# Patient Record
Sex: MALE | Race: BLACK OR AFRICAN AMERICAN | Employment: OTHER | URBAN - METROPOLITAN AREA
[De-identification: names, ages, dates, MRNs, and addresses within clinical notes are randomized per-mention and may not be internally consistent; named-entity substitution may affect disease eponyms.]

---

## 2019-05-29 ENCOUNTER — HOSPITAL ENCOUNTER (OUTPATIENT)
Dept: PREADMISSION TESTING | Age: 64
Discharge: HOME | End: 2019-05-29
Attending: OTOLARYNGOLOGY
Payer: MEDICARE

## 2019-05-29 DIAGNOSIS — Z01.818 OTHER SPECIFIED PRE-OPERATIVE EXAMINATION: ICD-10-CM

## 2019-05-29 DIAGNOSIS — Z01.812 BLOOD TESTS PRIOR TO TREATMENT OR PROCEDURE: ICD-10-CM

## 2019-05-29 DIAGNOSIS — D48.1 NEOPLASM OF UNCERTAIN BEHAVIOR OF CONNECTIVE AND OTHER SOFT TISSUE: ICD-10-CM

## 2019-05-29 LAB
HCT VFR BLD AUTO: 40.4 % (ref 36–48)
HGB BLD-MCNC: 13.4 G/DL (ref 13–16)
POTASSIUM SERPL-SCNC: 4.4 MMOL/L (ref 3.5–5.5)

## 2019-05-29 PROCEDURE — 93005 ELECTROCARDIOGRAM TRACING: CPT

## 2019-05-29 PROCEDURE — 85018 HEMOGLOBIN: CPT

## 2019-05-29 PROCEDURE — 36415 COLL VENOUS BLD VENIPUNCTURE: CPT

## 2019-05-29 PROCEDURE — 84132 ASSAY OF SERUM POTASSIUM: CPT

## 2019-05-30 LAB
ATRIAL RATE: 53 BPM
CALCULATED P AXIS, ECG09: 66 DEGREES
CALCULATED R AXIS, ECG10: -45 DEGREES
CALCULATED T AXIS, ECG11: 2 DEGREES
DIAGNOSIS, 93000: NORMAL
FAX TO INFO,FAXT: NORMAL
FAX TO NUMBER,FAXN: NORMAL
P-R INTERVAL, ECG05: 206 MS
Q-T INTERVAL, ECG07: 422 MS
QRS DURATION, ECG06: 116 MS
QTC CALCULATION (BEZET), ECG08: 395 MS
VENTRICULAR RATE, ECG03: 53 BPM

## 2019-06-05 ENCOUNTER — HOSPITAL ENCOUNTER (OUTPATIENT)
Dept: LAB | Age: 64
Discharge: HOME | End: 2019-06-05
Payer: MEDICARE

## 2019-06-05 PROCEDURE — 88331 PATH CONSLTJ SURG 1 BLK 1SPC: CPT

## 2019-06-05 PROCEDURE — 88305 TISSUE EXAM BY PATHOLOGIST: CPT

## 2019-06-05 PROCEDURE — 88342 IMHCHEM/IMCYTCHM 1ST ANTB: CPT

## 2021-07-02 ENCOUNTER — HOSPITAL ENCOUNTER (OUTPATIENT)
Dept: PREADMISSION TESTING | Age: 66
Discharge: HOME | End: 2021-07-02
Payer: MEDICARE

## 2021-07-02 ENCOUNTER — TRANSCRIBE ORDER (OUTPATIENT)
Dept: REGISTRATION | Age: 66
End: 2021-07-02

## 2021-07-02 DIAGNOSIS — D49.1 PLEURAL NEOPLASM: Primary | ICD-10-CM

## 2021-07-02 DIAGNOSIS — D49.1 PLEURAL NEOPLASM: ICD-10-CM

## 2021-07-02 LAB
ANION GAP SERPL CALC-SCNC: 6 MMOL/L (ref 3–18)
ATRIAL RATE: 82 BPM
BUN SERPL-MCNC: 15 MG/DL (ref 7–18)
BUN/CREAT SERPL: 14 (ref 12–20)
CALCIUM SERPL-MCNC: 8.3 MG/DL (ref 8.5–10.1)
CALCULATED P AXIS, ECG09: 75 DEGREES
CALCULATED R AXIS, ECG10: -63 DEGREES
CALCULATED T AXIS, ECG11: 49 DEGREES
CHLORIDE SERPL-SCNC: 115 MMOL/L (ref 100–111)
CO2 SERPL-SCNC: 22 MMOL/L (ref 21–32)
CREAT SERPL-MCNC: 1.09 MG/DL (ref 0.6–1.3)
DIAGNOSIS, 93000: NORMAL
EST. AVERAGE GLUCOSE BLD GHB EST-MCNC: 120 MG/DL
GLUCOSE SERPL-MCNC: 130 MG/DL (ref 74–99)
HBA1C MFR BLD: 5.8 % (ref 4.2–5.6)
HCT VFR BLD AUTO: 42.8 % (ref 36–48)
HGB BLD-MCNC: 13.1 G/DL (ref 13–16)
P-R INTERVAL, ECG05: 206 MS
POTASSIUM SERPL-SCNC: 4.4 MMOL/L (ref 3.5–5.5)
Q-T INTERVAL, ECG07: 370 MS
QRS DURATION, ECG06: 104 MS
QTC CALCULATION (BEZET), ECG08: 432 MS
SODIUM SERPL-SCNC: 143 MMOL/L (ref 136–145)
VENTRICULAR RATE, ECG03: 82 BPM

## 2021-07-02 PROCEDURE — 93005 ELECTROCARDIOGRAM TRACING: CPT

## 2021-07-02 PROCEDURE — 36415 COLL VENOUS BLD VENIPUNCTURE: CPT

## 2021-07-02 PROCEDURE — 83036 HEMOGLOBIN GLYCOSYLATED A1C: CPT

## 2021-07-02 PROCEDURE — 80048 BASIC METABOLIC PNL TOTAL CA: CPT

## 2021-07-02 PROCEDURE — 85018 HEMOGLOBIN: CPT

## 2021-07-14 ENCOUNTER — ANESTHESIA EVENT (OUTPATIENT)
Dept: SURGERY | Age: 66
End: 2021-07-14
Payer: MEDICARE

## 2021-07-14 ENCOUNTER — HOSPITAL ENCOUNTER (OUTPATIENT)
Age: 66
Setting detail: OUTPATIENT SURGERY
Discharge: HOME | End: 2021-07-14
Attending: OTOLARYNGOLOGY | Admitting: OTOLARYNGOLOGY
Payer: MEDICARE

## 2021-07-14 ENCOUNTER — ANESTHESIA (OUTPATIENT)
Dept: SURGERY | Age: 66
End: 2021-07-14
Payer: MEDICARE

## 2021-07-14 VITALS
HEART RATE: 71 BPM | WEIGHT: 183 LBS | OXYGEN SATURATION: 98 % | SYSTOLIC BLOOD PRESSURE: 148 MMHG | RESPIRATION RATE: 20 BRPM | TEMPERATURE: 97.6 F | BODY MASS INDEX: 22.29 KG/M2 | DIASTOLIC BLOOD PRESSURE: 71 MMHG | HEIGHT: 76 IN

## 2021-07-14 DIAGNOSIS — C32.1 SQUAMOUS CELL CANCER OF EPIGLOTTIS (HCC): Primary | ICD-10-CM

## 2021-07-14 LAB
GLUCOSE BLD STRIP.AUTO-MCNC: 142 MG/DL (ref 70–110)
GLUCOSE BLD STRIP.AUTO-MCNC: 207 MG/DL (ref 70–110)
GLUCOSE BLD STRIP.AUTO-MCNC: 63 MG/DL (ref 70–110)
GLUCOSE BLD STRIP.AUTO-MCNC: 64 MG/DL (ref 70–110)

## 2021-07-14 PROCEDURE — 74011000250 HC RX REV CODE- 250: Performed by: NURSE ANESTHETIST, CERTIFIED REGISTERED

## 2021-07-14 PROCEDURE — 77030020782 HC GWN BAIR PAWS FLX 3M -B: Performed by: OTOLARYNGOLOGY

## 2021-07-14 PROCEDURE — 74011250636 HC RX REV CODE- 250/636: Performed by: OTOLARYNGOLOGY

## 2021-07-14 PROCEDURE — 76210000021 HC REC RM PH II 0.5 TO 1 HR: Performed by: OTOLARYNGOLOGY

## 2021-07-14 PROCEDURE — 76010000149 HC OR TIME 1 TO 1.5 HR: Performed by: OTOLARYNGOLOGY

## 2021-07-14 PROCEDURE — 76060000033 HC ANESTHESIA 1 TO 1.5 HR: Performed by: OTOLARYNGOLOGY

## 2021-07-14 PROCEDURE — 74011000250 HC RX REV CODE- 250: Performed by: OTOLARYNGOLOGY

## 2021-07-14 PROCEDURE — 88305 TISSUE EXAM BY PATHOLOGIST: CPT

## 2021-07-14 PROCEDURE — 74011000250 HC RX REV CODE- 250: Performed by: ANESTHESIOLOGY

## 2021-07-14 PROCEDURE — 88342 IMHCHEM/IMCYTCHM 1ST ANTB: CPT

## 2021-07-14 PROCEDURE — 74011250637 HC RX REV CODE- 250/637: Performed by: OTOLARYNGOLOGY

## 2021-07-14 PROCEDURE — 88331 PATH CONSLTJ SURG 1 BLK 1SPC: CPT

## 2021-07-14 PROCEDURE — 77030006643: Performed by: ANESTHESIOLOGY

## 2021-07-14 PROCEDURE — 77030040361 HC SLV COMPR DVT MDII -B: Performed by: OTOLARYNGOLOGY

## 2021-07-14 PROCEDURE — 74011250636 HC RX REV CODE- 250/636: Performed by: ANESTHESIOLOGY

## 2021-07-14 PROCEDURE — 77030008477 HC STYL SATN SLP COVD -A: Performed by: ANESTHESIOLOGY

## 2021-07-14 PROCEDURE — 76210000016 HC OR PH I REC 1 TO 1.5 HR: Performed by: OTOLARYNGOLOGY

## 2021-07-14 PROCEDURE — 82962 GLUCOSE BLOOD TEST: CPT

## 2021-07-14 PROCEDURE — 2709999900 HC NON-CHARGEABLE SUPPLY: Performed by: OTOLARYNGOLOGY

## 2021-07-14 PROCEDURE — 74011250636 HC RX REV CODE- 250/636: Performed by: NURSE ANESTHETIST, CERTIFIED REGISTERED

## 2021-07-14 PROCEDURE — 77030008683 HC TU ET CUF COVD -A: Performed by: ANESTHESIOLOGY

## 2021-07-14 RX ORDER — MAGNESIUM SULFATE 100 %
4 CRYSTALS MISCELLANEOUS AS NEEDED
Status: DISCONTINUED | OUTPATIENT
Start: 2021-07-14 | End: 2021-07-14 | Stop reason: HOSPADM

## 2021-07-14 RX ORDER — SODIUM CHLORIDE, SODIUM LACTATE, POTASSIUM CHLORIDE, CALCIUM CHLORIDE 600; 310; 30; 20 MG/100ML; MG/100ML; MG/100ML; MG/100ML
125 INJECTION, SOLUTION INTRAVENOUS CONTINUOUS
Status: DISCONTINUED | OUTPATIENT
Start: 2021-07-14 | End: 2021-07-14 | Stop reason: HOSPADM

## 2021-07-14 RX ORDER — EPINEPHRINE 1 MG/ML
INJECTION, SOLUTION, CONCENTRATE INTRAVENOUS AS NEEDED
Status: DISCONTINUED | OUTPATIENT
Start: 2021-07-14 | End: 2021-07-14 | Stop reason: HOSPADM

## 2021-07-14 RX ORDER — LIDOCAINE HYDROCHLORIDE 40 MG/ML
SOLUTION TOPICAL AS NEEDED
Status: DISCONTINUED | OUTPATIENT
Start: 2021-07-14 | End: 2021-07-14 | Stop reason: HOSPADM

## 2021-07-14 RX ORDER — SUCCINYLCHOLINE CHLORIDE 100 MG/5ML
SYRINGE (ML) INTRAVENOUS AS NEEDED
Status: DISCONTINUED | OUTPATIENT
Start: 2021-07-14 | End: 2021-07-14 | Stop reason: HOSPADM

## 2021-07-14 RX ORDER — LIDOCAINE HYDROCHLORIDE 20 MG/ML
INJECTION, SOLUTION EPIDURAL; INFILTRATION; INTRACAUDAL; PERINEURAL AS NEEDED
Status: DISCONTINUED | OUTPATIENT
Start: 2021-07-14 | End: 2021-07-14 | Stop reason: HOSPADM

## 2021-07-14 RX ORDER — FENTANYL CITRATE 50 UG/ML
INJECTION, SOLUTION INTRAMUSCULAR; INTRAVENOUS AS NEEDED
Status: DISCONTINUED | OUTPATIENT
Start: 2021-07-14 | End: 2021-07-14 | Stop reason: HOSPADM

## 2021-07-14 RX ORDER — DEXTROSE, SODIUM CHLORIDE, SODIUM LACTATE, POTASSIUM CHLORIDE, AND CALCIUM CHLORIDE 5; .6; .31; .03; .02 G/100ML; G/100ML; G/100ML; G/100ML; G/100ML
125 INJECTION, SOLUTION INTRAVENOUS CONTINUOUS
Status: DISCONTINUED | OUTPATIENT
Start: 2021-07-14 | End: 2021-07-14 | Stop reason: HOSPADM

## 2021-07-14 RX ORDER — MIDAZOLAM HYDROCHLORIDE 1 MG/ML
INJECTION, SOLUTION INTRAMUSCULAR; INTRAVENOUS AS NEEDED
Status: DISCONTINUED | OUTPATIENT
Start: 2021-07-14 | End: 2021-07-14 | Stop reason: HOSPADM

## 2021-07-14 RX ORDER — DEXAMETHASONE SODIUM PHOSPHATE 4 MG/ML
INJECTION, SOLUTION INTRA-ARTICULAR; INTRALESIONAL; INTRAMUSCULAR; INTRAVENOUS; SOFT TISSUE AS NEEDED
Status: DISCONTINUED | OUTPATIENT
Start: 2021-07-14 | End: 2021-07-14 | Stop reason: HOSPADM

## 2021-07-14 RX ORDER — FENTANYL CITRATE 50 UG/ML
25 INJECTION, SOLUTION INTRAMUSCULAR; INTRAVENOUS
Status: DISCONTINUED | OUTPATIENT
Start: 2021-07-14 | End: 2021-07-14 | Stop reason: HOSPADM

## 2021-07-14 RX ORDER — NALOXONE HYDROCHLORIDE 0.4 MG/ML
0.1 INJECTION, SOLUTION INTRAMUSCULAR; INTRAVENOUS; SUBCUTANEOUS
Status: DISCONTINUED | OUTPATIENT
Start: 2021-07-14 | End: 2021-07-14 | Stop reason: HOSPADM

## 2021-07-14 RX ORDER — INSULIN LISPRO 100 [IU]/ML
INJECTION, SOLUTION INTRAVENOUS; SUBCUTANEOUS ONCE
Status: DISCONTINUED | OUTPATIENT
Start: 2021-07-14 | End: 2021-07-14 | Stop reason: HOSPADM

## 2021-07-14 RX ORDER — OXYCODONE AND ACETAMINOPHEN 5; 325 MG/1; MG/1
1 TABLET ORAL AS NEEDED
Status: DISCONTINUED | OUTPATIENT
Start: 2021-07-14 | End: 2021-07-14 | Stop reason: HOSPADM

## 2021-07-14 RX ORDER — SODIUM CHLORIDE, SODIUM LACTATE, POTASSIUM CHLORIDE, CALCIUM CHLORIDE 600; 310; 30; 20 MG/100ML; MG/100ML; MG/100ML; MG/100ML
50 INJECTION, SOLUTION INTRAVENOUS CONTINUOUS
Status: DISCONTINUED | OUTPATIENT
Start: 2021-07-14 | End: 2021-07-14 | Stop reason: HOSPADM

## 2021-07-14 RX ORDER — CLINDAMYCIN PHOSPHATE 900 MG/50ML
900 INJECTION, SOLUTION INTRAVENOUS ONCE
Status: DISCONTINUED | OUTPATIENT
Start: 2021-07-14 | End: 2021-07-14 | Stop reason: HOSPADM

## 2021-07-14 RX ORDER — HYDRALAZINE HYDROCHLORIDE 20 MG/ML
5 INJECTION INTRAMUSCULAR; INTRAVENOUS AS NEEDED
Status: COMPLETED | OUTPATIENT
Start: 2021-07-14 | End: 2021-07-14

## 2021-07-14 RX ORDER — CEFAZOLIN SODIUM/WATER 2 G/20 ML
2 SYRINGE (ML) INTRAVENOUS ONCE
Status: COMPLETED | OUTPATIENT
Start: 2021-07-14 | End: 2021-07-14

## 2021-07-14 RX ORDER — ROCURONIUM BROMIDE 10 MG/ML
INJECTION, SOLUTION INTRAVENOUS AS NEEDED
Status: DISCONTINUED | OUTPATIENT
Start: 2021-07-14 | End: 2021-07-14 | Stop reason: HOSPADM

## 2021-07-14 RX ORDER — ONDANSETRON 2 MG/ML
INJECTION INTRAMUSCULAR; INTRAVENOUS AS NEEDED
Status: DISCONTINUED | OUTPATIENT
Start: 2021-07-14 | End: 2021-07-14 | Stop reason: HOSPADM

## 2021-07-14 RX ORDER — PROPOFOL 10 MG/ML
INJECTION, EMULSION INTRAVENOUS AS NEEDED
Status: DISCONTINUED | OUTPATIENT
Start: 2021-07-14 | End: 2021-07-14 | Stop reason: HOSPADM

## 2021-07-14 RX ORDER — ONDANSETRON 2 MG/ML
4 INJECTION INTRAMUSCULAR; INTRAVENOUS ONCE
Status: DISCONTINUED | OUTPATIENT
Start: 2021-07-14 | End: 2021-07-14 | Stop reason: HOSPADM

## 2021-07-14 RX ORDER — ACETAMINOPHEN 500 MG
1000 TABLET ORAL ONCE
Status: COMPLETED | OUTPATIENT
Start: 2021-07-14 | End: 2021-07-14

## 2021-07-14 RX ORDER — DEXTROSE 50 % IN WATER (D50W) INTRAVENOUS SYRINGE
25-50 AS NEEDED
Status: DISCONTINUED | OUTPATIENT
Start: 2021-07-14 | End: 2021-07-14 | Stop reason: HOSPADM

## 2021-07-14 RX ORDER — HYDROMORPHONE HYDROCHLORIDE 1 MG/ML
0.5 INJECTION, SOLUTION INTRAMUSCULAR; INTRAVENOUS; SUBCUTANEOUS
Status: DISCONTINUED | OUTPATIENT
Start: 2021-07-14 | End: 2021-07-14 | Stop reason: HOSPADM

## 2021-07-14 RX ADMIN — DEXTROSE MONOHYDRATE 25 G: 25 INJECTION, SOLUTION INTRAVENOUS at 09:31

## 2021-07-14 RX ADMIN — HYDRALAZINE HYDROCHLORIDE 5 MG: 20 INJECTION INTRAMUSCULAR; INTRAVENOUS at 09:49

## 2021-07-14 RX ADMIN — PROPOFOL 50 MG: 10 INJECTION, EMULSION INTRAVENOUS at 08:17

## 2021-07-14 RX ADMIN — FENTANYL CITRATE 50 MCG: 50 INJECTION, SOLUTION INTRAMUSCULAR; INTRAVENOUS at 08:30

## 2021-07-14 RX ADMIN — ROCURONIUM BROMIDE 10 MG: 10 INJECTION, SOLUTION INTRAVENOUS at 08:15

## 2021-07-14 RX ADMIN — FENTANYL CITRATE 25 MCG: 50 INJECTION, SOLUTION INTRAMUSCULAR; INTRAVENOUS at 10:11

## 2021-07-14 RX ADMIN — CEFAZOLIN 2 G: 1 INJECTION, POWDER, FOR SOLUTION INTRAVENOUS at 08:08

## 2021-07-14 RX ADMIN — DEXAMETHASONE SODIUM PHOSPHATE 4 MG: 4 INJECTION, SOLUTION INTRAMUSCULAR; INTRAVENOUS at 08:30

## 2021-07-14 RX ADMIN — MIDAZOLAM 2 MG: 1 INJECTION INTRAMUSCULAR; INTRAVENOUS at 08:06

## 2021-07-14 RX ADMIN — SUGAMMADEX 75 MG: 100 INJECTION, SOLUTION INTRAVENOUS at 08:57

## 2021-07-14 RX ADMIN — SODIUM CHLORIDE, POTASSIUM CHLORIDE, SODIUM LACTATE AND CALCIUM CHLORIDE 125 ML/HR: 600; 310; 30; 20 INJECTION, SOLUTION INTRAVENOUS at 06:40

## 2021-07-14 RX ADMIN — HYDRALAZINE HYDROCHLORIDE 5 MG: 20 INJECTION INTRAMUSCULAR; INTRAVENOUS at 10:03

## 2021-07-14 RX ADMIN — SODIUM CHLORIDE, POTASSIUM CHLORIDE, SODIUM LACTATE AND CALCIUM CHLORIDE 125 ML/HR: 600; 310; 30; 20 INJECTION, SOLUTION INTRAVENOUS at 10:48

## 2021-07-14 RX ADMIN — LIDOCAINE HYDROCHLORIDE 80 MG: 20 INJECTION, SOLUTION INTRAVENOUS at 08:13

## 2021-07-14 RX ADMIN — Medication 100 MG: at 08:15

## 2021-07-14 RX ADMIN — PROPOFOL 150 MG: 10 INJECTION, EMULSION INTRAVENOUS at 08:15

## 2021-07-14 RX ADMIN — ONDANSETRON HYDROCHLORIDE 4 MG: 2 INJECTION INTRAMUSCULAR; INTRAVENOUS at 08:30

## 2021-07-14 RX ADMIN — FENTANYL CITRATE 50 MCG: 50 INJECTION, SOLUTION INTRAMUSCULAR; INTRAVENOUS at 08:13

## 2021-07-14 RX ADMIN — SODIUM CHLORIDE, SODIUM LACTATE, POTASSIUM CHLORIDE, CALCIUM CHLORIDE, AND DEXTROSE MONOHYDRATE 125 ML/HR: 600; 310; 30; 20; 5 INJECTION, SOLUTION INTRAVENOUS at 06:54

## 2021-07-14 RX ADMIN — ACETAMINOPHEN 1000 MG: 500 TABLET ORAL at 06:35

## 2021-07-14 NOTE — OP NOTES
[facility] [place]  OPERATIVE REPORT    Name:  Martha Srinivasan  MR#:   [id number]  :  1955  ACCOUNT #:  [de-identified]  DATE OF SERVICE:  2021    PREOPERATIVE DIAGNOSIS:  Epiglottic mass. POSTOPERATIVE DIAGNOSIS:  Squamous cell carcinoma, epiglottis, supraglottis. PROCEDURE PERFORMED:  Micro direct laryngoscopy with biopsy. SURGEON:  Kenny Cobb DO    ASSISTANT:  No surgical assistant. ANESTHESIA:  General endotracheal.    COMPLICATIONS:  None. SPECIMENS REMOVED:  Sent frozen to Pathology followed by permanent section. IMPLANTS:  None. ESTIMATED BLOOD LOSS:  Less than 25 mL. DRAINS:  None. INDICATIONS:  This is a 51-year-old male who was treated with combined chemoradiation for supraglottic cancer last year. I have been following him, and I had an area of concern on his epiglottis that I thought was either infection or recurrent tumor. Unfortunately, he did not respond to antibiotics and was brought today for micro direct laryngoscopy and biopsy. FINDINGS:  Positive squamous cell carcinoma on the supraglottis via frozen. DIAGNOSIS:  Recurrent supraglottic, epiglottic carcinoma. PROCEDURE:  The patient was brought into the operating room and placed supine on the operating room table. General anesthetic and intubation was assisted by me with a Meadows blade due to friable tissue in the supraglottis. The patient was then rotated 90 degrees. A surgical pause was performed and then the Dedo laryngoscope was brought in the field and examination was performed and topical 4% lidocaine was placed in the larynx. At this point, photo documentation was obtained, and under the operative endoscope, I examined the patient with the Dedo laryngoscope. There was significant epiglottic lesion.   I then used the Garfield Rodrigueszabeck laryngoscope to biopsy several areas in the supraglottis, first sending for frozen section which was positive for squamous cell carcinoma and then sending significantly more as permanent sectioning for typing. The biopsy sites were treated with topical epinephrine on neuro patties. He was suctioned and reexamined. There was no significant bleeding, and he was given back to Anesthesia, awoken in the operating without difficulty, and transferred to PACU with stable vital signs.       Jose Yan DO      [id number]  D:  07/14/2021 15:43  T:  07/14/2021 19:01  JOB #:  [id number]

## 2021-07-14 NOTE — INTERVAL H&P NOTE
Update History & Physical    The Patient's History and Physical of July 2, 2021 was reviewed with the patient and I examined the patient. There was no change. The surgical site was confirmed by the patient and me. Plan:  The risk, benefits, expected outcome, and alternative to the recommended procedure have been discussed with the patient. Patient understands and wants to proceed with the procedure.     Electronically signed by Marcie Samuels DO on 7/14/2021 at 8:01 AM

## 2021-07-14 NOTE — ANESTHESIA PREPROCEDURE EVALUATION
Relevant Problems   No relevant active problems       Anesthetic History   No history of anesthetic complications            Review of Systems / Medical History  Patient summary reviewed, nursing notes reviewed and pertinent labs reviewed    Pulmonary  Within defined limits                 Neuro/Psych   Within defined limits           Cardiovascular    Hypertension                   GI/Hepatic/Renal  Within defined limits              Endo/Other    Diabetes         Other Findings              Physical Exam    Airway  Mallampati: II  TM Distance: 4 - 6 cm  Neck ROM: normal range of motion   Mouth opening: Normal     Cardiovascular  Regular rate and rhythm,  S1 and S2 normal,  no murmur, click, rub, or gallop             Dental    Dentition: Edentulous     Pulmonary  Breath sounds clear to auscultation               Abdominal  GI exam deferred       Other Findings            Anesthetic Plan    ASA: 3  Anesthesia type: general          Induction: Intravenous  Anesthetic plan and risks discussed with: Patient

## 2021-07-14 NOTE — DISCHARGE INSTRUCTIONS
Drink plenty of fluids - nothing red in color or hot in temperature  Soft diet  Ambulate in house  Shower tomorrow  Take prescriptions as directed  Avoid heavy lifting or straining  Voice rest today  Follow up with Dr. Dorie Reyna in 10 days    DISCHARGE SUMMARY from Nurse    PATIENT INSTRUCTIONS:    After general anesthesia or intravenous sedation, for 24 hours or while taking prescription Narcotics:  · Limit your activities  · Do not drive and operate hazardous machinery  · Do not make important personal or business decisions  · Do  not drink alcoholic beverages  · If you have not urinated within 8 hours after discharge, please contact your surgeon on call. Report the following to your surgeon:  · Excessive pain, swelling, redness or odor of or around the surgical area  · Temperature over 100.5  · Nausea and vomiting lasting longer than 4 hours or if unable to take medications  · Any signs of decreased circulation or nerve impairment to extremity: change in color, persistent  numbness, tingling, coldness or increase pain  · Any questions    What to do at Home:  Recommended activity: Ambulate in house and No driving while on analgesics,     If you experience any of the following symptoms fever, chills, uncontrollable pain , active bleeding, excessive swelling ,nausea, vomiting, please follow up with Dr. Dorie Reyna. *  Please give a list of your current medications to your Primary Care Provider. *  Please update this list whenever your medications are discontinued, doses are      changed, or new medications (including over-the-counter products) are added. *  Please carry medication information at all times in case of emergency situations. These are general instructions for a healthy lifestyle:    No smoking/ No tobacco products/ Avoid exposure to second hand smoke  Surgeon General's Warning:  Quitting smoking now greatly reduces serious risk to your health.     Obesity, smoking, and sedentary lifestyle greatly increases your risk for illness    A healthy diet, regular physical exercise & weight monitoring are important for maintaining a healthy lifestyle    You may be retaining fluid if you have a history of heart failure or if you experience any of the following symptoms:  Weight gain of 3 pounds or more overnight or 5 pounds in a week, increased swelling in our hands or feet or shortness of breath while lying flat in bed. Please call your doctor as soon as you notice any of these symptoms; do not wait until your next office visit. Patient armband removed and shredded    The discharge information has been reviewed with the patient and caregiver. The patient and caregiver verbalized understanding. Discharge medications reviewed with the patient and caregiver and appropriate educational materials and side effects teaching were provided. Learning About COVID-19 and Social Distancing  What is it? Social distancing means putting space between yourself and other people. The recommended distance is 6 feet, or about 2 meters. This also means staying away from any place where people may gather, such as [place] or other public gathering places. Why is it important? Social distancing is the best way to reduce the spread of COVID-19. This virus seems to spread from person to person through droplets from coughing and sneezing. So if you keep your distance from others, you're less likely to get it or spread it. And social distancing is important for everyone, not just those who are at high risk of infection, like older people. You might have the virus but not have symptoms. You could then give the infection to someone you come into contact with. How is it done? Putting 6 feet, or about 2 meters, between you and other people is the recommended distance. Also stay away from any place where people may gather, such as [place] or other public gathering places.  So if possible:  · Work from home, and keep your kids at home.  · Don't travel if you don't have to. And avoid public transportation, ride-shares, and taxis unless you have no choice. · Limit shopping to essentials, like food and medicines. · Wear a cloth face cover if you have to go to a public place like the grocery store or pharmacy. · Don't eat in restaurants. (You can still get takeout or food deliveries.)  · Avoid crowds and busy places. Follow stay-at-home orders or other directions for your area. Where can you learn more? Go to http://www.gray.com/  Enter A133 in the search box to learn more about \"Learning About COVID-19 and Social Distancing. \"  Current as of: December 18, 2020               Content Version: 12.8  © 1300-7417 [employer]. Care instructions adapted under license by [employer] (which disclaims liability or warranty for this information). If you have questions about a medical condition or this instruction, always ask your healthcare professional. [address] any warranty or liability for your use of this information.     ___________________________________________________________________________________________________________________________________

## 2021-07-14 NOTE — PROGRESS NOTES
Operative Report    Indications: This is a 77 yrs male who presents with throat pain . He was positive for SCCA larynx in past.  The patient was admitted for surgery as conservative measures have failed. Date of Procedure: 7/14/2021    Procedure Name: Procedure(s): MICRO DIRECT LARYNGOSCOPY WITH BIOPSY    Preoperative Diagnosis: NEOPLASM OF EPIGLOTTIS    Postoperative Diagnosis:  NEOPLASM OF EPIGLOTTIS    Surgeon(s):  Desire Burgos DO    Assistant: Circ-1: Reza Gibbons RN  Circ-2: Torin Medrano  Scrub Tech-1: Socrates Sanabriaton  Surg Asst-1: Katherine Pike    Anesthesia:  General    Findings:  SCCA epiglottis    Estimated Blood Loss:  < 10ml    Specimens: sent to path           Implants: * No implants in log *         Complications:  None; patient tolerated the procedure well.     Signed By: Homero Lacy DO     July 14, 2021

## 2021-07-14 NOTE — ANESTHESIA POSTPROCEDURE EVALUATION
Post-Anesthesia Evaluation and Assessment    Cardiovascular Function/Vital Signs  Visit Vitals  BP (!) 161/73   Pulse 70   Temp 36.2 °C (97.2 °F)   Resp 12   Ht 6' 4\" (1.93 m)   Wt 83 kg (183 lb)   SpO2 98%   BMI 22.28 kg/m²       Patient is status post Procedure(s): MICRO DIRECT LARYNGOSCOPY WITH BIOPSY. Nausea/Vomiting: Controlled. Postoperative hydration reviewed and adequate. Pain:  Pain Scale 1: FLACC (07/14/21 1024)  Pain Intensity 1: 0 (07/14/21 1024)   Managed. Neurological Status:   Neuro (WDL): Exceptions to WDL (07/14/21 1012)   At baseline. Mental Status and Level of Consciousness: Baseline and stable. Pulmonary Status:   O2 Device: None (Room air) (07/14/21 0939)   Adequate oxygenation and airway patent. Complications related to anesthesia: None    Post-anesthesia assessment completed. No concerns. Patient has met all discharge requirements.     Signed By: Brittani Bedolla MD

## 2021-07-14 NOTE — PERIOP NOTES
Reviewed PTA medication list with patient/caregiver and patient/caregiver denies any additional medications. Patient admits to having a responsible adult care for them at home for at least 24 hours after surgery. Patient encouraged to use gown warming system and informed that using said warming gown to regulate body temperature prior to a procedure has been shown to help reduce the risks of blood clots and infection. Patient's pharmacy of choice verified and documented in PTA medication section. Dual skin assessment & fall risk band verification completed with Dipti Cole RN.

## 2021-07-14 NOTE — PERIOP NOTES
Discharge instructions reviewed with patient and family. Opportunity for questions and answers given. No further questions at this time.    AVS med list reviewed for duplicates - denies difficulty swallowing ,denies SOB, scant amounts of saliva with bloody mucous noted when pt coughs - pts wife states \" he does this all the time \"

## 2023-01-21 ENCOUNTER — APPOINTMENT (OUTPATIENT)
Dept: NON INVASIVE DIAGNOSTICS | Age: 68
DRG: 481 | End: 2023-01-21
Attending: HOSPITALIST
Payer: MEDICARE

## 2023-01-21 ENCOUNTER — HOSPITAL ENCOUNTER (INPATIENT)
Age: 68
LOS: 4 days | Discharge: HOME HEALTH | DRG: 481 | End: 2023-01-25
Attending: EMERGENCY MEDICINE | Admitting: HOSPITALIST
Payer: MEDICARE

## 2023-01-21 ENCOUNTER — APPOINTMENT (OUTPATIENT)
Dept: GENERAL RADIOLOGY | Age: 68
DRG: 481 | End: 2023-01-21
Attending: PHYSICIAN ASSISTANT
Payer: MEDICARE

## 2023-01-21 DIAGNOSIS — S72.145A CLOSED NONDISPLACED INTERTROCHANTERIC FRACTURE OF LEFT FEMUR, INITIAL ENCOUNTER (HCC): Primary | ICD-10-CM

## 2023-01-21 PROBLEM — I10 HYPERTENSION: Status: ACTIVE | Noted: 2023-01-21

## 2023-01-21 PROBLEM — C34.90 LUNG CANCER (HCC): Status: ACTIVE | Noted: 2023-01-21

## 2023-01-21 PROBLEM — I73.9 PAD (PERIPHERAL ARTERY DISEASE) (HCC): Status: ACTIVE | Noted: 2023-01-21

## 2023-01-21 PROBLEM — Z93.0 TRACHEOSTOMY IN PLACE (HCC): Status: ACTIVE | Noted: 2023-01-21

## 2023-01-21 PROBLEM — C14.0 THROAT CANCER (HCC): Status: ACTIVE | Noted: 2023-01-21

## 2023-01-21 PROBLEM — S72.142A INTERTROCHANTERIC FRACTURE OF LEFT FEMUR (HCC): Status: ACTIVE | Noted: 2023-01-21

## 2023-01-21 PROBLEM — H54.8 LEGALLY BLIND: Status: ACTIVE | Noted: 2023-01-21

## 2023-01-21 PROBLEM — E11.9 DIABETES (HCC): Status: ACTIVE | Noted: 2023-01-21

## 2023-01-21 LAB
ANION GAP SERPL CALC-SCNC: 9 MMOL/L (ref 3–18)
BASOPHILS # BLD: 0 K/UL (ref 0–0.1)
BASOPHILS NFR BLD: 0 % (ref 0–2)
BUN SERPL-MCNC: 14 MG/DL (ref 7–18)
BUN/CREAT SERPL: 13 (ref 12–20)
CALCIUM SERPL-MCNC: 8.2 MG/DL (ref 8.5–10.1)
CHLORIDE SERPL-SCNC: 115 MMOL/L (ref 100–111)
CO2 SERPL-SCNC: 17 MMOL/L (ref 21–32)
CREAT SERPL-MCNC: 1.11 MG/DL (ref 0.6–1.3)
DIFFERENTIAL METHOD BLD: ABNORMAL
ECHO AO ROOT DIAM: 3.1 CM
ECHO AO ROOT INDEX: 1.5 CM/M2
ECHO AV AREA PEAK VELOCITY: 2.5 CM2
ECHO AV AREA VTI: 3 CM2
ECHO AV AREA/BSA PEAK VELOCITY: 1.2 CM2/M2
ECHO AV AREA/BSA VTI: 1.4 CM2/M2
ECHO AV MEAN GRADIENT: 1 MMHG
ECHO AV MEAN VELOCITY: 0.5 M/S
ECHO AV PEAK GRADIENT: 3 MMHG
ECHO AV PEAK VELOCITY: 0.9 M/S
ECHO AV VELOCITY RATIO: 0.67
ECHO AV VTI: 14.1 CM
ECHO EST RA PRESSURE: 3 MMHG
ECHO IVC PROX: 1.4 CM
ECHO LV E' LATERAL VELOCITY: 5 CM/S
ECHO LV E' SEPTAL VELOCITY: 5 CM/S
ECHO LV FRACTIONAL SHORTENING: 40 % (ref 28–44)
ECHO LV INTERNAL DIMENSION DIASTOLE INDEX: 1.93 CM/M2
ECHO LV INTERNAL DIMENSION DIASTOLIC: 4 CM (ref 4.2–5.9)
ECHO LV INTERNAL DIMENSION SYSTOLIC INDEX: 1.16 CM/M2
ECHO LV INTERNAL DIMENSION SYSTOLIC: 2.4 CM
ECHO LV IVSD: 0.7 CM (ref 0.6–1)
ECHO LV MASS 2D: 85.8 G (ref 88–224)
ECHO LV MASS INDEX 2D: 41.4 G/M2 (ref 49–115)
ECHO LV POSTERIOR WALL DIASTOLIC: 0.8 CM (ref 0.6–1)
ECHO LV RELATIVE WALL THICKNESS RATIO: 0.4
ECHO LVOT AREA: 4.2 CM2
ECHO LVOT AV VTI INDEX: 0.74
ECHO LVOT DIAM: 2.3 CM
ECHO LVOT MEAN GRADIENT: 1 MMHG
ECHO LVOT PEAK GRADIENT: 1 MMHG
ECHO LVOT PEAK VELOCITY: 0.6 M/S
ECHO LVOT STROKE VOLUME INDEX: 20.9 ML/M2
ECHO LVOT SV: 43.2 ML
ECHO LVOT VTI: 10.4 CM
ECHO MV A VELOCITY: 0.82 M/S
ECHO MV E DECELERATION TIME (DT): 146.1 MS
ECHO MV E VELOCITY: 0.52 M/S
ECHO MV E/A RATIO: 0.63
ECHO MV E/E' LATERAL: 10.4
ECHO MV E/E' RATIO (AVERAGED): 10.4
ECHO MV E/E' SEPTAL: 10.4
ECHO RIGHT VENTRICULAR SYSTOLIC PRESSURE (RVSP): 18 MMHG
ECHO RV FREE WALL PEAK S': 13 CM/S
ECHO RV TAPSE: 1.8 CM (ref 1.7–?)
ECHO TV REGURGITANT MAX VELOCITY: 1.96 M/S
ECHO TV REGURGITANT PEAK GRADIENT: 15 MMHG
EOSINOPHIL # BLD: 0 K/UL (ref 0–0.4)
EOSINOPHIL NFR BLD: 0 % (ref 0–5)
ERYTHROCYTE [DISTWIDTH] IN BLOOD BY AUTOMATED COUNT: 18.4 % (ref 11.6–14.5)
GLUCOSE BLD STRIP.AUTO-MCNC: 212 MG/DL (ref 70–110)
GLUCOSE SERPL-MCNC: 277 MG/DL (ref 74–99)
HCT VFR BLD AUTO: 25.6 % (ref 36–48)
HGB BLD-MCNC: 8.6 G/DL (ref 13–16)
IMM GRANULOCYTES # BLD AUTO: 0.1 K/UL (ref 0–0.04)
IMM GRANULOCYTES NFR BLD AUTO: 1 % (ref 0–0.5)
LYMPHOCYTES # BLD: 0.4 K/UL (ref 0.9–3.6)
LYMPHOCYTES NFR BLD: 7 % (ref 21–52)
MCH RBC QN AUTO: 29 PG (ref 24–34)
MCHC RBC AUTO-ENTMCNC: 33.6 G/DL (ref 31–37)
MCV RBC AUTO: 86.2 FL (ref 78–100)
MONOCYTES # BLD: 0.5 K/UL (ref 0.05–1.2)
MONOCYTES NFR BLD: 9 % (ref 3–10)
NEUTS SEG # BLD: 3.9 K/UL (ref 1.8–8)
NEUTS SEG NFR BLD: 81 % (ref 40–73)
NRBC # BLD: 0 K/UL (ref 0–0.01)
NRBC BLD-RTO: 0 PER 100 WBC
PLATELET # BLD AUTO: 79 K/UL (ref 135–420)
PMV BLD AUTO: 10.4 FL (ref 9.2–11.8)
POTASSIUM SERPL-SCNC: 3.8 MMOL/L (ref 3.5–5.5)
RBC # BLD AUTO: 2.97 M/UL (ref 4.35–5.65)
SODIUM SERPL-SCNC: 141 MMOL/L (ref 136–145)
WBC # BLD AUTO: 4.9 K/UL (ref 4.6–13.2)

## 2023-01-21 PROCEDURE — 65270000029 HC RM PRIVATE

## 2023-01-21 PROCEDURE — 96374 THER/PROPH/DIAG INJ IV PUSH: CPT

## 2023-01-21 PROCEDURE — 73552 X-RAY EXAM OF FEMUR 2/>: CPT

## 2023-01-21 PROCEDURE — 74011250636 HC RX REV CODE- 250/636: Performed by: PHYSICIAN ASSISTANT

## 2023-01-21 PROCEDURE — 85025 COMPLETE CBC W/AUTO DIFF WBC: CPT

## 2023-01-21 PROCEDURE — 74011636637 HC RX REV CODE- 636/637: Performed by: HOSPITALIST

## 2023-01-21 PROCEDURE — 82962 GLUCOSE BLOOD TEST: CPT

## 2023-01-21 PROCEDURE — 93005 ELECTROCARDIOGRAM TRACING: CPT

## 2023-01-21 PROCEDURE — 96375 TX/PRO/DX INJ NEW DRUG ADDON: CPT

## 2023-01-21 PROCEDURE — 71045 X-RAY EXAM CHEST 1 VIEW: CPT

## 2023-01-21 PROCEDURE — 74011250636 HC RX REV CODE- 250/636: Performed by: INTERNAL MEDICINE

## 2023-01-21 PROCEDURE — 74011250637 HC RX REV CODE- 250/637: Performed by: HOSPITALIST

## 2023-01-21 PROCEDURE — 80048 BASIC METABOLIC PNL TOTAL CA: CPT

## 2023-01-21 PROCEDURE — 73502 X-RAY EXAM HIP UNI 2-3 VIEWS: CPT

## 2023-01-21 PROCEDURE — 99285 EMERGENCY DEPT VISIT HI MDM: CPT

## 2023-01-21 PROCEDURE — C8929 TTE W OR WO FOL WCON,DOPPLER: HCPCS

## 2023-01-21 RX ORDER — MORPHINE SULFATE 2 MG/ML
2 INJECTION, SOLUTION INTRAMUSCULAR; INTRAVENOUS
Status: COMPLETED | OUTPATIENT
Start: 2023-01-21 | End: 2023-01-21

## 2023-01-21 RX ORDER — INSULIN LISPRO 100 [IU]/ML
INJECTION, SOLUTION INTRAVENOUS; SUBCUTANEOUS
Status: DISCONTINUED | OUTPATIENT
Start: 2023-01-21 | End: 2023-01-25 | Stop reason: HOSPADM

## 2023-01-21 RX ORDER — ONDANSETRON 2 MG/ML
4 INJECTION INTRAMUSCULAR; INTRAVENOUS
Status: COMPLETED | OUTPATIENT
Start: 2023-01-21 | End: 2023-01-21

## 2023-01-21 RX ORDER — DEXTROSE MONOHYDRATE 100 MG/ML
0-250 INJECTION, SOLUTION INTRAVENOUS AS NEEDED
Status: DISCONTINUED | OUTPATIENT
Start: 2023-01-21 | End: 2023-01-25 | Stop reason: HOSPADM

## 2023-01-21 RX ORDER — IBUPROFEN 200 MG
16 TABLET ORAL AS NEEDED
Status: DISCONTINUED | OUTPATIENT
Start: 2023-01-21 | End: 2023-01-25 | Stop reason: HOSPADM

## 2023-01-21 RX ORDER — FENTANYL CITRATE 50 UG/ML
50 INJECTION, SOLUTION INTRAMUSCULAR; INTRAVENOUS
Status: COMPLETED | OUTPATIENT
Start: 2023-01-21 | End: 2023-01-21

## 2023-01-21 RX ORDER — HYDROMORPHONE HYDROCHLORIDE 1 MG/ML
1 INJECTION, SOLUTION INTRAMUSCULAR; INTRAVENOUS; SUBCUTANEOUS
Status: DISCONTINUED | OUTPATIENT
Start: 2023-01-21 | End: 2023-01-21

## 2023-01-21 RX ORDER — ACETAZOLAMIDE 250 MG/1
250 TABLET ORAL 3 TIMES DAILY
Status: DISCONTINUED | OUTPATIENT
Start: 2023-01-21 | End: 2023-01-25 | Stop reason: HOSPADM

## 2023-01-21 RX ORDER — ONDANSETRON 2 MG/ML
4 INJECTION INTRAMUSCULAR; INTRAVENOUS
Status: DISCONTINUED | OUTPATIENT
Start: 2023-01-21 | End: 2023-01-25 | Stop reason: HOSPADM

## 2023-01-21 RX ORDER — HYDROMORPHONE HYDROCHLORIDE 2 MG/ML
2 INJECTION, SOLUTION INTRAMUSCULAR; INTRAVENOUS; SUBCUTANEOUS
Status: DISCONTINUED | OUTPATIENT
Start: 2023-01-21 | End: 2023-01-22

## 2023-01-21 RX ORDER — LEVOTHYROXINE SODIUM 25 UG/1
25 TABLET ORAL DAILY
Status: DISCONTINUED | OUTPATIENT
Start: 2023-01-22 | End: 2023-01-25 | Stop reason: HOSPADM

## 2023-01-21 RX ORDER — PRAVASTATIN SODIUM 20 MG/1
40 TABLET ORAL
Status: DISCONTINUED | OUTPATIENT
Start: 2023-01-21 | End: 2023-01-25 | Stop reason: HOSPADM

## 2023-01-21 RX ADMIN — FENTANYL CITRATE 50 MCG: 50 INJECTION, SOLUTION INTRAMUSCULAR; INTRAVENOUS at 16:16

## 2023-01-21 RX ADMIN — ONDANSETRON 4 MG: 2 INJECTION INTRAMUSCULAR; INTRAVENOUS at 21:01

## 2023-01-21 RX ADMIN — ONDANSETRON 4 MG: 2 INJECTION INTRAMUSCULAR; INTRAVENOUS at 16:17

## 2023-01-21 RX ADMIN — HYDROMORPHONE HYDROCHLORIDE 1 MG: 1 INJECTION, SOLUTION INTRAMUSCULAR; INTRAVENOUS; SUBCUTANEOUS at 21:00

## 2023-01-21 RX ADMIN — SODIUM CHLORIDE 500 ML: 9 INJECTION, SOLUTION INTRAVENOUS at 17:09

## 2023-01-21 RX ADMIN — Medication 4 UNITS: at 23:00

## 2023-01-21 RX ADMIN — PRAVASTATIN SODIUM 40 MG: 20 TABLET ORAL at 23:00

## 2023-01-21 RX ADMIN — MORPHINE SULFATE 2 MG: 2 INJECTION, SOLUTION INTRAMUSCULAR; INTRAVENOUS at 18:08

## 2023-01-21 RX ADMIN — ACETAZOLAMIDE 250 MG: 250 TABLET ORAL at 23:00

## 2023-01-21 RX ADMIN — MORPHINE SULFATE 2 MG: 2 INJECTION, SOLUTION INTRAMUSCULAR; INTRAVENOUS at 19:38

## 2023-01-21 NOTE — ED PROVIDER NOTES
[facility] EMERGENCY DEPT  EMERGENCY DEPARTMENT ENCOUNTER       Pt Name: Partick Lockwood  MRN: [id number]  [place] [birth year]  Date of evaluation: 1/21/2023  Provider: THU Villarreal   PCP: Mary Montenegro MD  Note Started: 3:37 PM 1/21/23     CHIEF COMPLAINT       Chief Complaint   Patient presents with    Hip Pain    Fall        HISTORY OF PRESENT ILLNESS: 1 or more elements      History From: Patient, Patient's Wife, and EMS  HPI Limitations : Other (nonverbal)     Patrick Lockwood is a 79 y.o. male with PMHx of legal blindness, hypertension, diabetes throat cancer with tracheostomy in place, current lung cancer undergoing chemotherapy, PAD s/p unknown leg vascular surgery  who presents to ED c/o left hip and thigh pain. Patient communicates by writing on a hand-held white board and can nod to yes or no questions. States that he was walking into the bedroom, tripped over his sneakers and fell onto his left side injuring his left hip. His daughter called EMS because he cannot get up or move his left leg due to hip pain. He denies any other injuries, specifically denies head injury, loss of consciousness, neck pain, back pain, chest pain, right lower extremity pain, upper extremity injury. His oncologist is with [facility], Dr. Zoë Lynch. He does not currently have a orthopedist.     Nursing Notes were all reviewed and agreed with or any disagreements were addressed in the HPI. REVIEW OF SYSTEMS     Positives and Pertinent negatives as per HPI.     PAST HISTORY     Past Medical History:  Past Medical History:   Diagnosis Date    Cancer ([facility] [address] 2019    throat with radiation    Diabetes ([facility] [address]     Hypertension     not on meds       Past Surgical History:  Past Surgical History:   Procedure Laterality Date    HX ORTHOPAEDIC Right 2010    amputation x3 toes     HX ORTHOPAEDIC Left 2013    amputation x2 toes     IN UNLISTED PROCEDURE VASCULAR SURGERY Right     arterial stent     IN UNLISTED PROCEDURE VASCULAR SURGERY Left \"replaced an artery\"       Family History:  History reviewed. No pertinent family history. Social History:  Social History     Tobacco Use    Smoking status: Former     Types: Cigarettes     Quit date: 2013     Years since quitting: 10.0    Smokeless tobacco: Never   Vaping Use    Vaping Use: Never used   Substance Use Topics    Alcohol use: Not Currently    Drug use: Never       Allergies:  No Known Allergies    CURRENT MEDICATIONS      Previous Medications    ACETAMINOPHEN (TYLENOL) 325 MG TABLET    Take 325 mg by mouth every four (4) hours as needed for Pain. ACETAZOLAMIDE (DIAMOX) 250 MG TABLET    Take 250 mg by mouth three (3) times daily. ASPIRIN DELAYED-RELEASE 81 MG TABLET    Take 81 mg by mouth daily. ERGOCALCIFEROL (ERGOCALCIFEROL) 1,250 MCG (50,000 UNIT) CAPSULE    Take 5,000 Units by mouth every seven (7) days. Saturday    INSULIN ASPART PROTAMINE/INSULIN ASPART (NOVOLOG MIX 70/30) 100 UNIT/ML (70-30) INPN    by SubCUTAneous route two (2) times a day. 20 units in am  20 units in pm    LEVOTHYROXINE SODIUM (SYNTHROID PO)    Take  by mouth. OTHER    1 % nightly as needed. Neomycin    Left eye    POLYSORBATE 80/GLYCERIN (REFRESH DRY EYE THERAPY OP)    Apply  to eye as needed. PRAVASTATIN (PRAVACHOL) 40 MG TABLET    Take 40 mg by mouth nightly. PREDNISOLONE ACETATE (PRED FORTE) 1 % OPHTHALMIC SUSPENSION    Administer 1 Drop to right eye two (2) times a day. TIMOLOL/DORZOLAMIDE/LATANOP/PF (TIMOLOL-DORZOLAMID-LATANOP,PF,) 0.5-2-0.005 % DROP    Apply  to eye three (3) times daily. Left eye       SCREENINGS               No data recorded         PHYSICAL EXAM      ED Triage Vitals [01/21/23 1523]   ED Encounter Vitals Group      /63      Pulse (Heart Rate) 72      Resp Rate 18      Temp 97.9 °F (36.6 °C)      Temp src       O2 Sat (%) 100 %      Weight 170 lb      Height 6' 4\"        Physical Exam  Vital signs and nursing notes reviewed. CONSTITUTIONAL: Alert.   Tall thin chronically ill-appearing male in no distress. HEAD: Normocephalic; atraumatic. EYES: +legal blindness, eyes are closed. ENT: TM's normal. External ear normal. Normal nose; no rhinorrhea. Normal pharynx. Tonsils not enlarged without exudate. Moist mucus membranes. NECK: Supple; tracheostomy in place. Non-tender. No midline C-spine tenderness or deformity. CV: Normal S1, S2; no murmurs, rubs, or gallops. No chest wall tenderness. RESPIRATORY: Normal chest excursion with respiration; breath sounds clear and equal bilaterally; no wheezes, rhonchi, or rales. GI: Non-distended; non-tender. 2 well-healed ostomy scars. BACK:  No evidence of trauma or deformity. Non-tender to palpation. EXT: RLE: Nontender, F ROM without pain. LLE: Left hip with generalized tenderness and unable to range due to pain. Leg appears slightly shortened and externally rotated. Status post 2 toe amputations, well-healed. BUE: nontender, FROM without pain. SKIN: Normal for age and race; warm; dry; good turgor; no apparent lesions or exudate. NEURO: A & O x3. Cranial nerves 2-12 intact. Motor 5/5 bilaterally. Sensation intact. PSYCH:  Mood and affect appropriate. DIAGNOSTIC RESULTS   LABS:     Recent Results (from the past 12 hour(s))   CBC WITH AUTOMATED DIFF    Collection Time: 01/21/23  4:11 PM   Result Value Ref Range    WBC 4.9 4.6 - 13.2 K/uL    RBC 2.97 (L) 4.35 - 5.65 M/uL    HGB 8.6 (L) 13.0 - 16.0 g/dL    HCT 25.6 (L) 36.0 - 48.0 %    MCV 86.2 78.0 - 100.0 FL    MCH 29.0 24.0 - 34.0 PG    MCHC 33.6 31.0 - 37.0 g/dL    RDW 18.4 (H) 11.6 - 14.5 %    PLATELET 79 (L) 474 - 420 K/uL    MPV 10.4 9.2 - 11.8 FL    NRBC 0.0 0  WBC    ABSOLUTE NRBC 0.00 0.00 - 0.01 K/uL    NEUTROPHILS 81 (H) 40 - 73 %    LYMPHOCYTES 7 (L) 21 - 52 %    MONOCYTES 9 3 - 10 %    EOSINOPHILS 0 0 - 5 %    BASOPHILS 0 0 - 2 %    IMMATURE GRANULOCYTES 1 (H) 0.0 - 0.5 %    ABS. NEUTROPHILS 3.9 1.8 - 8.0 K/UL    ABS.  LYMPHOCYTES 0.4 (L) 0.9 - 3.6 K/UL    ABS. MONOCYTES 0.5 0.05 - 1.2 K/UL    ABS. EOSINOPHILS 0.0 0.0 - 0.4 K/UL    ABS. BASOPHILS 0.0 0.0 - 0.1 K/UL    ABS. IMM. GRANS. 0.1 (H) 0.00 - 0.04 K/UL    DF AUTOMATED     METABOLIC PANEL, BASIC    Collection Time: 01/21/23  4:11 PM   Result Value Ref Range    Sodium 141 136 - 145 mmol/L    Potassium 3.8 3.5 - 5.5 mmol/L    Chloride 115 (H) 100 - 111 mmol/L    CO2 17 (L) 21 - 32 mmol/L    Anion gap 9 3.0 - 18 mmol/L    Glucose 277 (H) 74 - 99 mg/dL    BUN 14 7.0 - 18 MG/DL    Creatinine 1.11 0.6 - 1.3 MG/DL    BUN/Creatinine ratio 13 12 - 20      eGFR >60 >60 ml/min/1.73m2    Calcium 8.2 (L) 8.5 - 10.1 MG/DL         RADIOLOGY:  Non-plain film images such as CT, Ultrasound and MRI are read by the radiologist. Plain radiographic images are visualized and preliminarily interpreted by the ED Provider with the below findings:        Interpretation per the Radiologist below, if available at the time of this note:     XR CHEST SNGL V    Result Date: 1/21/2023  INDICATION:  possible preop FINDINGS: Single AP portable view of the chest obtained at 1638 demonstrates a normal cardiomediastinal silhouette. The lungs are clear bilaterally. There is a right chest portacatheter. No osseous abnormalities are seen. No evidence of acute cardiopulmonary process. XR HIP LT W OR WO PELV 2-3 VWS    Result Date: 1/21/2023  Clinical Data:  left hip pain s/p trip and fall from standing Comparison;  None. FINDINGS: AP view of the pelvis and frogleg lateral view of the left  hip demonstrate an acute intertrochanteric left proximal femoral fracture with avulsion of the greater trochanter and mild varus angulation. The bones are osteopenic. There is bilateral hip DJD. Pubic symphysis appears fused. Vascular stents are noted on the right. Acute intertrochanteric left proximal femoral fracture, associated avulsion of the greater trochanter, and varus angulation of the proximal femur.      XR FEMUR LT 2 V    Result Date: 1/21/2023  INDICATION:  pain s/p trip and fall from standing COMPARISON: None FINDINGS: AP and lateral views of the left femur demonstrates an acute intertrochanteric proximal left femur fracture with varus angulation. There is avulsion of the greater trochanter. There is hip and knee DJD, severe in the knee. The bones are osteopenic. Vascular calcifications are noted. 1. Acute intertrochanteric proximal left femur fracture with varus angulation. Associated avulsion of the greater trochanter. 2. Severe knee DJD. PROCEDURES   Unless otherwise noted below, none  Procedures     CRITICAL CARE TIME   none    EMERGENCY DEPARTMENT COURSE and DIFFERENTIAL DIAGNOSIS/MDM   Vitals:    Vitals:    01/21/23 1523   BP: 111/63   Pulse: 72   Resp: 18   Temp: 97.9 °F (36.6 °C)   SpO2: 100%   Weight: 77.1 kg (170 lb)   Height: 6' 4\" (1.93 m)        Patient was given the following medications:  Medications   sodium chloride 0.9 % bolus infusion 500 mL (500 mL IntraVENous New Bag 1/21/23 1709)   morphine injection 2 mg (has no administration in time range)   fentaNYL citrate (PF) injection 50 mcg (50 mcg IntraVENous Given 1/21/23 1616)   ondansetron (ZOFRAN) injection 4 mg (4 mg IntraVENous Given 1/21/23 1617)       CONSULTS: (Who and What was discussed)  IP CONSULT TO ORTHOPEDIC SURGERY  IP CONSULT TO HOSPITALIST    Chronic Conditions: HTN, DM, nonverbal with trach in placed due to remote throat CA; lung CA, PAD    Social Determinants affecting Dx or Tx: None    Records Reviewed (source and summary): Nursing Notes and Old Medical Records    ED Course     5:32 PM consult note  Case discussed with orthopedist on-call Dr. Jasmin Brunner who will see patient in consult and requests NPO status after midnight. 5:39 PM consult note  Case discussed with hospitalist Dr. Kraig Johns, who will admit to medical floor.     Medical Decision Making/Disposition Considerations: 59-year-old male with multiple comorbidities presents status post trip and fall from standing at home just prior to arrival.  His only complaint is of left hip pain and he has an intertrochanteric fracture on the left on left hip x-ray. No head injury, loss of consciousness or any other injuries and is NV intact. Pain controlled with IV fentanyl and IV morphine. Will admit to hospitalist for further management and orthopedic consult. FINAL IMPRESSION     1. Closed nondisplaced intertrochanteric fracture of left femur, initial encounter [facility])          DISPOSITION/PLAN   Admitted    Admit Note: Pt is being admitted by Dr. Erik Medrano. The results of their tests and reason(s) for their admission have been discussed with pt and/or available family. They convey agreement and understanding for the need to be admitted and for the admission diagnosis. CONDITIONS ON ADMISSION:  Deep Vein Thrombosis is not present at the time of admission. Thrombosis is not present at the time of admission. Urinary Tract Infection is not present at the time of admission. Pneumonia is not present at the time of admission. MRSA is not present at the time of admission. Wound infection is not present at the time of admission. Pressure Ulcer is not present at the time of admission. I am the Primary Clinician of Record. (Please note that parts of this dictation were completed with voice recognition software. Quite often unanticipated grammatical, syntax, homophones, and other interpretive errors are inadvertently transcribed by the computer software. Please disregards these errors.  Please excuse any errors that have escaped final proofreading.)

## 2023-01-21 NOTE — CONSULTS
Orthopedic Consultation:    HPI: 70yo male with multiple medical comorbidities including active lung cancer undergoing chemotherapy. Plan:   Pending medical clearance would benefit from surgical stabilization of the left hip fracture with cephalomedullary nail. Plan for surgical intervention tomorrow.

## 2023-01-21 NOTE — Clinical Note
Status[de-identified] INPATIENT [101]   Type of Bed: Medical [8]   Cardiac Monitoring Required?: No   Inpatient Hospitalization Certified Necessary for the Following Reasons: 2.  Patient admitted for Inpatient Only Procedure (Surgical)   Admitting Diagnosis: Intertrochanteric fracture of left femur [facility]) [[id number]]   Admitting Physician: Bertha Young [[id number]]   Attending Physician: Bertha Young [[id number]]   Estimated Length of Stay: 2 Midnights   Discharge Plan[de-identified] 2003 [facility]

## 2023-01-21 NOTE — ED TRIAGE NOTES
Pt arrived via EMS with c/o left hip pain after a fall out of his w/c onto the carpet PTA. Pts wife activated 46 and is at the bedside. Denies hitting his head, no blood thinners. Pt is legally blind. Hx Ca and trach- pt non-verbal. A&O x4.

## 2023-01-21 NOTE — CONSULTS
[facility] Consult Note      Patient: Nayeli De Jesus MRN: [id number]  SSN: [id number]    YOB: 1955  Age: 79 y.o. Sex: male    Date of Consultation: 1/23/2023    Reason for Consultation: Preoperative cardiac clearance    HPI:  I was asked by Dr. Felix Hanks to see this pleasant patient for cardiac clearance for hip surgery. Nayeli De Jesus is a 80-year-old very pleasant gentleman came to the hospital with fall and hip fracture cardiology was called in for cardiac clearance before hip surgery. Patient's past medical history is significant for diabetes mellitus, hypertension, PAD, throat cancer and lung cancer on chemotherapy legally blind. No known history of CAD, CABG or valvular heart disease. Patient denied any history of chest pain or orthopnea PND or ankle swelling. Minimal chronic shortness of breath. Patient is also status post tracheostomy. No recent history of DVT or pulmonary embolism or ischemic stroke. Patient is lying supine comfortable without any distress his O2 saturation is 100% with normal hemodynamics. Past Medical History:   Diagnosis Date    Cancer ([facility] [address] 2019    throat with radiation    Diabetes ([facility] [address]     Hypertension     not on meds     Past Surgical History:   Procedure Laterality Date    HX ORTHOPAEDIC Right 2010    amputation x3 toes     HX ORTHOPAEDIC Left 2013    amputation x2 toes     [place] UNLISTED PROCEDURE VASCULAR SURGERY Right     arterial stent     WA UNLISTED PROCEDURE VASCULAR SURGERY Left     \"replaced an artery\"     No current facility-administered medications for this encounter. Current Outpatient Medications   Medication Sig    levothyroxine sodium (SYNTHROID PO) Take  by mouth. aspirin delayed-release 81 mg tablet Take 81 mg by mouth daily. pravastatin (PRAVACHOL) 40 mg tablet Take 40 mg by mouth nightly. acetaZOLAMIDE (DIAMOX) 250 mg tablet Take 250 mg by mouth three (3) times daily.     timolol/dorzolamide/latanop/PF (timoloL-dorzolamid-latanop,PF,) 0.5-2-0.005 % drop Apply  to eye three (3) times daily. Left eye    polysorbate 80/glycerin (REFRESH DRY EYE THERAPY OP) Apply  to eye as needed. OTHER 1 % nightly as needed. Neomycin    Left eye    insulin aspart protamine/insulin aspart (NOVOLOG MIX 70/30) 100 unit/mL (70-30) inpn by SubCUTAneous route two (2) times a day. 20 units in am  20 units in pm (Patient not taking: Reported on 1/21/2023)    acetaminophen (TYLENOL) 325 mg tablet Take 325 mg by mouth every four (4) hours as needed for Pain. prednisoLONE acetate (PRED FORTE) 1 % ophthalmic suspension Administer 1 Drop to right eye two (2) times a day. (Patient not taking: Reported on 1/21/2023)    ergocalciferol (ERGOCALCIFEROL) 1,250 mcg (50,000 unit) capsule Take 5,000 Units by mouth every seven (7) days. Saturday (Patient not taking: Reported on 1/21/2023)       Allergies and Intolerances:   No Known Allergies    Family History:   History reviewed. No pertinent family history. Social History:   He  reports that he quit smoking about 10 years ago. He has never used smokeless tobacco.  He  reports that he does not currently use alcohol. Review of Systems:     Review of Systems  status post fall and hip pain    Gen: No fever, chills, malaise, weight loss/gain. Heent: No headache, rhinorrhea, epistaxis, ear pain, hearing loss, sinus pain, neck pain/stiffness, sore throat. Heart: No chest pain, palpitations, LEMUS, pnd, or orthopnea. Resp: No cough, hemoptysis, wheezing and shortness of breath. GI: No nausea, vomiting, diarrhea, constipation, melena or hematochezia. : No urinary obstruction, dysuria or hematuria. Derm: No rash, new skin lesion or pruritis. Musc/skeletal: no bone or ++joint complains. Vasc: No edema, cyanosis or claudication. Endo: No heat/cold intolerance, no polyuria,polydipsia or polyphagia. Neuro: No unilateral weakness, numbness, tingling. No seizures.    Heme: No easy bruising or bleeding. Physical:   Patient Vitals for the past 6 hrs:   Temp Pulse Resp BP SpO2   01/21/23 1811 -- -- -- 107/64 --   01/21/23 1523 97.9 °F (36.6 °C) 72 18 111/63 100 %         Exam:   General Appearance: Comfortable, not using accessory muscles of respiration. HEENT: WON. HEAD: Atraumatic  NECK: No JVD, no thyroidomeglay. CAROTIDS:  LUNGS: Clear bilaterally. HEART: S1+S2     ABD: Non-tender, BS Audible    EXT: No edema, and no cysnosis. VASCULAR EXAM: Pulses are intact. PSYCHIATRIC EXAM: Mood is appropriate. MUSCULOSKELETAL: Grossly no joint deformity. NEUROLOGICAL: Motor and sensory sytem intact and Cranial nerves II-XII intact. Review of Data:   LABS:   Lab Results   Component Value Date/Time    WBC 4.9 01/21/2023 04:11 PM    HGB 8.6 (L) 01/21/2023 04:11 PM    HCT 25.6 (L) 01/21/2023 04:11 PM    PLATELET 79 (L) 68/48/6326 04:11 PM     Lab Results   Component Value Date/Time    Sodium 141 01/21/2023 04:11 PM    Potassium 3.8 01/21/2023 04:11 PM    Chloride 115 (H) 01/21/2023 04:11 PM    CO2 17 (L) 01/21/2023 04:11 PM    Glucose 277 (H) 01/21/2023 04:11 PM    BUN 14 01/21/2023 04:11 PM    Creatinine 1.11 01/21/2023 04:11 PM     No results found for: CHOL, CHOLX, CHLST, CHOLV, HDL, HDLP, LDL, LDLC, DLDLP, TGLX, TRIGL, TRIGP  No components found for: GPT  Lab Results   Component Value Date/Time    Hemoglobin A1c 5.8 (H) 07/02/2021 10:53 AM       RADIOLOGY:  CT Results  (Last 48 hours)      None          CXR Results  (Last 48 hours)                 01/21/23 1702  XR CHEST SNGL V Final result    Impression:  No evidence of acute cardiopulmonary process. Narrative:  INDICATION:  possible preop        FINDINGS: Single AP portable view of the chest obtained at 1638 demonstrates a   normal cardiomediastinal silhouette. The lungs are clear bilaterally. There is a   right chest portacatheter. No osseous abnormalities are seen.                      Cardiology Procedures: Results for orders placed or performed during the hospital encounter of 07/02/21   EKG, 12 LEAD, INITIAL   Result Value Ref Range    Ventricular Rate 82 BPM    Atrial Rate 82 BPM    P-R Interval 206 ms    QRS Duration 104 ms    Q-T Interval 370 ms    QTC Calculation (Bezet) 432 ms    Calculated P Axis 75 degrees    Calculated R Axis -63 degrees    Calculated T Axis 49 degrees    Diagnosis       Normal sinus rhythm  Left axis deviation  Abnormal ECG  When compared with ECG of 29-MAY-2019 13:30,  premature ventricular complexes are no longer present  Vent. rate has increased BY  29 BPM  T wave inversion no longer evident in Inferior leads  Confirmed by Vivi Regan MD, -- ([id number]) on 7/2/2021 8:35:39 PM        Echo Results  (Last 48 hours)      None         Cardiolite (Tc-99m Sestamibi) stress test        Impression / Plan:    Patient Active Problem List   Diagnosis Code    Intertrochanteric fracture of left femur ([facility] [address] S72.142A       A/P    Preoperative cardiac clearance  Hip fracture  PAD  Diabetes mellitus  Hypertension  Throat and lung cancer  Status post tracheostomy  Legally blind        Plan. Clinically no evidence of ACS or decompensated heart failure  I have suggested to get EKG, echocardiogram to assess LV systolic and diastolic function  No recent DVT, pulmonary embolism stroke, renal failure  No recent echocardiogram or stress test results available  If EKG and echocardiogram stable then patient will be cleared for hip surgery with elevated risk as per AHA/ACC guideline  Unfortunately cannot assess his functional capacity due to blindness overall patient is able to manage his daily activities of life as per patient and his wife. Dicussed with Dr. Divine Durant  Thank you for allowing me to participate in the management of this pleasant patient.   Please feel free to call me if you have any questions or concerns        Signed By: Ivory Donovan MD     January 21, 2023

## 2023-01-22 ENCOUNTER — APPOINTMENT (OUTPATIENT)
Dept: GENERAL RADIOLOGY | Age: 68
DRG: 481 | End: 2023-01-22
Attending: ORTHOPAEDIC SURGERY
Payer: MEDICARE

## 2023-01-22 ENCOUNTER — ANESTHESIA (OUTPATIENT)
Dept: SURGERY | Age: 68
DRG: 481 | End: 2023-01-22
Payer: MEDICARE

## 2023-01-22 ENCOUNTER — ANESTHESIA EVENT (OUTPATIENT)
Dept: SURGERY | Age: 68
DRG: 481 | End: 2023-01-22
Payer: MEDICARE

## 2023-01-22 PROBLEM — D69.6 THROMBOCYTOPENIA (HCC): Status: ACTIVE | Noted: 2023-01-22

## 2023-01-22 LAB
APTT PPP: 29.1 SEC (ref 23–36.4)
GLUCOSE BLD STRIP.AUTO-MCNC: 159 MG/DL (ref 70–110)
GLUCOSE BLD STRIP.AUTO-MCNC: 178 MG/DL (ref 70–110)
GLUCOSE BLD STRIP.AUTO-MCNC: 204 MG/DL (ref 70–110)
GLUCOSE BLD STRIP.AUTO-MCNC: 222 MG/DL (ref 70–110)
INR PPP: 1.1 (ref 0.8–1.2)
PROTHROMBIN TIME: 14.8 SEC (ref 11.5–15.2)

## 2023-01-22 PROCEDURE — 77030031140 HC SUT VCRL3 J&J -A: Performed by: ORTHOPAEDIC SURGERY

## 2023-01-22 PROCEDURE — 77030013708 HC HNDPC SUC IRR PULS STRY –B: Performed by: ORTHOPAEDIC SURGERY

## 2023-01-22 PROCEDURE — 74011250636 HC RX REV CODE- 250/636: Performed by: ANESTHESIOLOGY

## 2023-01-22 PROCEDURE — C1713 ANCHOR/SCREW BN/BN,TIS/BN: HCPCS | Performed by: ORTHOPAEDIC SURGERY

## 2023-01-22 PROCEDURE — 74011000250 HC RX REV CODE- 250: Performed by: ORTHOPAEDIC SURGERY

## 2023-01-22 PROCEDURE — 77030020782 HC GWN BAIR PAWS FLX 3M -B: Performed by: ORTHOPAEDIC SURGERY

## 2023-01-22 PROCEDURE — 82962 GLUCOSE BLOOD TEST: CPT

## 2023-01-22 PROCEDURE — 77030013079 HC BLNKT BAIR HGGR 3M -A: Performed by: ANESTHESIOLOGY

## 2023-01-22 PROCEDURE — 74011250637 HC RX REV CODE- 250/637: Performed by: HOSPITALIST

## 2023-01-22 PROCEDURE — 51798 US URINE CAPACITY MEASURE: CPT

## 2023-01-22 PROCEDURE — 88311 DECALCIFY TISSUE: CPT

## 2023-01-22 PROCEDURE — 76210000016 HC OR PH I REC 1 TO 1.5 HR: Performed by: ORTHOPAEDIC SURGERY

## 2023-01-22 PROCEDURE — 77030042513: Performed by: ORTHOPAEDIC SURGERY

## 2023-01-22 PROCEDURE — 86900 BLOOD TYPING SEROLOGIC ABO: CPT

## 2023-01-22 PROCEDURE — 36415 COLL VENOUS BLD VENIPUNCTURE: CPT

## 2023-01-22 PROCEDURE — 85610 PROTHROMBIN TIME: CPT

## 2023-01-22 PROCEDURE — 77030008683 HC TU ET CUF COVD -A: Performed by: ANESTHESIOLOGY

## 2023-01-22 PROCEDURE — 73501 X-RAY EXAM HIP UNI 1 VIEW: CPT

## 2023-01-22 PROCEDURE — C1769 GUIDE WIRE: HCPCS | Performed by: ORTHOPAEDIC SURGERY

## 2023-01-22 PROCEDURE — 74011250636 HC RX REV CODE- 250/636: Performed by: NURSE ANESTHETIST, CERTIFIED REGISTERED

## 2023-01-22 PROCEDURE — 77030014405 HC GD ROD RMR SYNT -C: Performed by: ORTHOPAEDIC SURGERY

## 2023-01-22 PROCEDURE — 74011250637 HC RX REV CODE- 250/637: Performed by: ORTHOPAEDIC SURGERY

## 2023-01-22 PROCEDURE — 77030042512 HC BIT DRL -F: Performed by: ORTHOPAEDIC SURGERY

## 2023-01-22 PROCEDURE — 86923 COMPATIBILITY TEST ELECTRIC: CPT

## 2023-01-22 PROCEDURE — 74011636637 HC RX REV CODE- 636/637: Performed by: ORTHOPAEDIC SURGERY

## 2023-01-22 PROCEDURE — 2709999900 HC NON-CHARGEABLE SUPPLY: Performed by: ORTHOPAEDIC SURGERY

## 2023-01-22 PROCEDURE — 77030031139 HC SUT VCRL2 J&J -A: Performed by: ORTHOPAEDIC SURGERY

## 2023-01-22 PROCEDURE — 85730 THROMBOPLASTIN TIME PARTIAL: CPT

## 2023-01-22 PROCEDURE — 74011636637 HC RX REV CODE- 636/637: Performed by: ANESTHESIOLOGY

## 2023-01-22 PROCEDURE — 88307 TISSUE EXAM BY PATHOLOGIST: CPT

## 2023-01-22 PROCEDURE — 65270000032 HC RM SEMIPRIVATE

## 2023-01-22 PROCEDURE — 74011000250 HC RX REV CODE- 250: Performed by: ANESTHESIOLOGY

## 2023-01-22 PROCEDURE — P9073 PLATELETS PHERESIS PATH REDU: HCPCS

## 2023-01-22 PROCEDURE — 74011250636 HC RX REV CODE- 250/636: Performed by: ORTHOPAEDIC SURGERY

## 2023-01-22 PROCEDURE — 0QS706Z REPOSITION LEFT UPPER FEMUR WITH INTRAMEDULLARY INTERNAL FIXATION DEVICE, OPEN APPROACH: ICD-10-PCS | Performed by: ORTHOPAEDIC SURGERY

## 2023-01-22 PROCEDURE — 74011250636 HC RX REV CODE- 250/636: Performed by: HOSPITALIST

## 2023-01-22 PROCEDURE — 77030008462 HC STPLR SKN PROX J&J -A: Performed by: ORTHOPAEDIC SURGERY

## 2023-01-22 PROCEDURE — 76010000153 HC OR TIME 1.5 TO 2 HR: Performed by: ORTHOPAEDIC SURGERY

## 2023-01-22 PROCEDURE — 76060000034 HC ANESTHESIA 1.5 TO 2 HR: Performed by: ORTHOPAEDIC SURGERY

## 2023-01-22 PROCEDURE — 77030040361 HC SLV COMPR DVT MDII -B: Performed by: ORTHOPAEDIC SURGERY

## 2023-01-22 PROCEDURE — 77030016474 HC BIT DRL QC3 SYNT -C: Performed by: ORTHOPAEDIC SURGERY

## 2023-01-22 DEVICE — IMPLANTABLE DEVICE: Type: IMPLANTABLE DEVICE | Site: HIP | Status: FUNCTIONAL

## 2023-01-22 DEVICE — SCREW BNE L38MM DIA5MM TIB LT GRN TI ST CANN LOK FULL THRD: Type: IMPLANTABLE DEVICE | Site: HIP | Status: FUNCTIONAL

## 2023-01-22 RX ORDER — CEFAZOLIN SODIUM 1 G/3ML
INJECTION, POWDER, FOR SOLUTION INTRAMUSCULAR; INTRAVENOUS AS NEEDED
Status: DISCONTINUED | OUTPATIENT
Start: 2023-01-22 | End: 2023-01-22 | Stop reason: HOSPADM

## 2023-01-22 RX ORDER — NALOXONE HYDROCHLORIDE 0.4 MG/ML
0.4 INJECTION, SOLUTION INTRAMUSCULAR; INTRAVENOUS; SUBCUTANEOUS AS NEEDED
Status: DISCONTINUED | OUTPATIENT
Start: 2023-01-22 | End: 2023-01-25 | Stop reason: HOSPADM

## 2023-01-22 RX ORDER — MIDAZOLAM HYDROCHLORIDE 1 MG/ML
INJECTION, SOLUTION INTRAMUSCULAR; INTRAVENOUS AS NEEDED
Status: DISCONTINUED | OUTPATIENT
Start: 2023-01-22 | End: 2023-01-22 | Stop reason: HOSPADM

## 2023-01-22 RX ORDER — HYDROMORPHONE HYDROCHLORIDE 1 MG/ML
0.2 INJECTION, SOLUTION INTRAMUSCULAR; INTRAVENOUS; SUBCUTANEOUS
Status: DISCONTINUED | OUTPATIENT
Start: 2023-01-22 | End: 2023-01-22 | Stop reason: HOSPADM

## 2023-01-22 RX ORDER — SODIUM CHLORIDE 9 MG/ML
INJECTION, SOLUTION INTRAVENOUS
Status: DISCONTINUED | OUTPATIENT
Start: 2023-01-22 | End: 2023-01-22 | Stop reason: HOSPADM

## 2023-01-22 RX ORDER — ONDANSETRON 2 MG/ML
INJECTION INTRAMUSCULAR; INTRAVENOUS AS NEEDED
Status: DISCONTINUED | OUTPATIENT
Start: 2023-01-22 | End: 2023-01-22 | Stop reason: HOSPADM

## 2023-01-22 RX ORDER — ACETAMINOPHEN 500 MG
1000 TABLET ORAL EVERY 8 HOURS
Status: DISCONTINUED | OUTPATIENT
Start: 2023-01-22 | End: 2023-01-25 | Stop reason: HOSPADM

## 2023-01-22 RX ORDER — LORAZEPAM 0.5 MG/1
0.5 TABLET ORAL
Status: DISCONTINUED | OUTPATIENT
Start: 2023-01-22 | End: 2023-01-25 | Stop reason: HOSPADM

## 2023-01-22 RX ORDER — ASPIRIN 81 MG/1
81 TABLET ORAL 2 TIMES DAILY
Status: DISCONTINUED | OUTPATIENT
Start: 2023-01-22 | End: 2023-01-25 | Stop reason: HOSPADM

## 2023-01-22 RX ORDER — SODIUM CHLORIDE, SODIUM LACTATE, POTASSIUM CHLORIDE, CALCIUM CHLORIDE 600; 310; 30; 20 MG/100ML; MG/100ML; MG/100ML; MG/100ML
1000 INJECTION, SOLUTION INTRAVENOUS CONTINUOUS
Status: DISCONTINUED | OUTPATIENT
Start: 2023-01-22 | End: 2023-01-22 | Stop reason: HOSPADM

## 2023-01-22 RX ORDER — SODIUM CHLORIDE 0.9 % (FLUSH) 0.9 %
5-40 SYRINGE (ML) INJECTION EVERY 8 HOURS
Status: DISCONTINUED | OUTPATIENT
Start: 2023-01-22 | End: 2023-01-25 | Stop reason: HOSPADM

## 2023-01-22 RX ORDER — FENTANYL CITRATE 50 UG/ML
25 INJECTION, SOLUTION INTRAMUSCULAR; INTRAVENOUS AS NEEDED
Status: DISCONTINUED | OUTPATIENT
Start: 2023-01-22 | End: 2023-01-22 | Stop reason: HOSPADM

## 2023-01-22 RX ORDER — LIDOCAINE HYDROCHLORIDE 20 MG/ML
INJECTION, SOLUTION EPIDURAL; INFILTRATION; INTRACAUDAL; PERINEURAL AS NEEDED
Status: DISCONTINUED | OUTPATIENT
Start: 2023-01-22 | End: 2023-01-22 | Stop reason: HOSPADM

## 2023-01-22 RX ORDER — SODIUM CHLORIDE, SODIUM LACTATE, POTASSIUM CHLORIDE, CALCIUM CHLORIDE 600; 310; 30; 20 MG/100ML; MG/100ML; MG/100ML; MG/100ML
125 INJECTION, SOLUTION INTRAVENOUS CONTINUOUS
Status: DISPENSED | OUTPATIENT
Start: 2023-01-22 | End: 2023-01-23

## 2023-01-22 RX ORDER — OXYCODONE AND ACETAMINOPHEN 5; 325 MG/1; MG/1
1 TABLET ORAL AS NEEDED
Status: DISCONTINUED | OUTPATIENT
Start: 2023-01-22 | End: 2023-01-22 | Stop reason: HOSPADM

## 2023-01-22 RX ORDER — LANOLIN ALCOHOL/MO/W.PET/CERES
1 CREAM (GRAM) TOPICAL
Status: DISCONTINUED | OUTPATIENT
Start: 2023-01-23 | End: 2023-01-25 | Stop reason: HOSPADM

## 2023-01-22 RX ORDER — INSULIN LISPRO 100 [IU]/ML
INJECTION, SOLUTION INTRAVENOUS; SUBCUTANEOUS ONCE
Status: COMPLETED | OUTPATIENT
Start: 2023-01-22 | End: 2023-01-22

## 2023-01-22 RX ORDER — DIPHENHYDRAMINE HCL 25 MG
25 CAPSULE ORAL
Status: DISCONTINUED | OUTPATIENT
Start: 2023-01-22 | End: 2023-01-25 | Stop reason: HOSPADM

## 2023-01-22 RX ORDER — NALOXONE HYDROCHLORIDE 0.4 MG/ML
0.2 INJECTION, SOLUTION INTRAMUSCULAR; INTRAVENOUS; SUBCUTANEOUS AS NEEDED
Status: DISCONTINUED | OUTPATIENT
Start: 2023-01-22 | End: 2023-01-22 | Stop reason: HOSPADM

## 2023-01-22 RX ORDER — CEFAZOLIN SODIUM/WATER 2 G/20 ML
2 SYRINGE (ML) INTRAVENOUS EVERY 8 HOURS
Status: COMPLETED | OUTPATIENT
Start: 2023-01-22 | End: 2023-01-23

## 2023-01-22 RX ORDER — SODIUM CHLORIDE 0.9 % (FLUSH) 0.9 %
5-40 SYRINGE (ML) INJECTION AS NEEDED
Status: DISCONTINUED | OUTPATIENT
Start: 2023-01-22 | End: 2023-01-25 | Stop reason: HOSPADM

## 2023-01-22 RX ORDER — ALBUTEROL SULFATE 0.83 MG/ML
2.5 SOLUTION RESPIRATORY (INHALATION) AS NEEDED
Status: DISCONTINUED | OUTPATIENT
Start: 2023-01-22 | End: 2023-01-22 | Stop reason: HOSPADM

## 2023-01-22 RX ORDER — EPHEDRINE SULFATE/0.9% NACL/PF 50 MG/5 ML
SYRINGE (ML) INTRAVENOUS AS NEEDED
Status: DISCONTINUED | OUTPATIENT
Start: 2023-01-22 | End: 2023-01-22 | Stop reason: HOSPADM

## 2023-01-22 RX ORDER — KETAMINE HYDROCHLORIDE 10 MG/ML
INJECTION INTRAMUSCULAR; INTRAVENOUS AS NEEDED
Status: DISCONTINUED | OUTPATIENT
Start: 2023-01-22 | End: 2023-01-22 | Stop reason: HOSPADM

## 2023-01-22 RX ORDER — DIPHENHYDRAMINE HYDROCHLORIDE 50 MG/ML
12.5 INJECTION, SOLUTION INTRAMUSCULAR; INTRAVENOUS
Status: DISCONTINUED | OUTPATIENT
Start: 2023-01-22 | End: 2023-01-22 | Stop reason: HOSPADM

## 2023-01-22 RX ORDER — OXYCODONE HYDROCHLORIDE 5 MG/1
10 TABLET ORAL
Status: DISCONTINUED | OUTPATIENT
Start: 2023-01-22 | End: 2023-01-25 | Stop reason: HOSPADM

## 2023-01-22 RX ORDER — OXYCODONE HYDROCHLORIDE 5 MG/1
5 TABLET ORAL
Status: DISCONTINUED | OUTPATIENT
Start: 2023-01-22 | End: 2023-01-25 | Stop reason: HOSPADM

## 2023-01-22 RX ORDER — ROCURONIUM BROMIDE 10 MG/ML
INJECTION, SOLUTION INTRAVENOUS AS NEEDED
Status: DISCONTINUED | OUTPATIENT
Start: 2023-01-22 | End: 2023-01-22 | Stop reason: HOSPADM

## 2023-01-22 RX ORDER — TRANEXAMIC ACID 100 MG/ML
INJECTION, SOLUTION INTRAVENOUS AS NEEDED
Status: DISCONTINUED | OUTPATIENT
Start: 2023-01-22 | End: 2023-01-22 | Stop reason: HOSPADM

## 2023-01-22 RX ORDER — SODIUM CHLORIDE 9 MG/ML
250 INJECTION, SOLUTION INTRAVENOUS AS NEEDED
Status: DISCONTINUED | OUTPATIENT
Start: 2023-01-22 | End: 2023-01-23 | Stop reason: SDUPTHER

## 2023-01-22 RX ORDER — SODIUM CHLORIDE, SODIUM LACTATE, POTASSIUM CHLORIDE, CALCIUM CHLORIDE 600; 310; 30; 20 MG/100ML; MG/100ML; MG/100ML; MG/100ML
INJECTION, SOLUTION INTRAVENOUS
Status: DISCONTINUED | OUTPATIENT
Start: 2023-01-22 | End: 2023-01-22 | Stop reason: HOSPADM

## 2023-01-22 RX ORDER — PHENYLEPHRINE HCL IN 0.9% NACL 1 MG/10 ML
SYRINGE (ML) INTRAVENOUS AS NEEDED
Status: DISCONTINUED | OUTPATIENT
Start: 2023-01-22 | End: 2023-01-22 | Stop reason: HOSPADM

## 2023-01-22 RX ORDER — FLUMAZENIL 0.1 MG/ML
0.2 INJECTION INTRAVENOUS
Status: DISCONTINUED | OUTPATIENT
Start: 2023-01-22 | End: 2023-01-22 | Stop reason: HOSPADM

## 2023-01-22 RX ORDER — PROPOFOL 10 MG/ML
INJECTION, EMULSION INTRAVENOUS AS NEEDED
Status: DISCONTINUED | OUTPATIENT
Start: 2023-01-22 | End: 2023-01-22 | Stop reason: HOSPADM

## 2023-01-22 RX ORDER — ENOXAPARIN SODIUM 100 MG/ML
40 INJECTION SUBCUTANEOUS DAILY
Status: DISCONTINUED | OUTPATIENT
Start: 2023-01-23 | End: 2023-01-25 | Stop reason: HOSPADM

## 2023-01-22 RX ORDER — AMOXICILLIN 250 MG
1 CAPSULE ORAL 2 TIMES DAILY
Status: DISCONTINUED | OUTPATIENT
Start: 2023-01-22 | End: 2023-01-25 | Stop reason: HOSPADM

## 2023-01-22 RX ADMIN — Medication 200 MCG: at 16:47

## 2023-01-22 RX ADMIN — KETAMINE HYDROCHLORIDE 20 MG: 10 INJECTION, SOLUTION INTRAMUSCULAR; INTRAVENOUS at 15:55

## 2023-01-22 RX ADMIN — Medication 10 MG: at 16:36

## 2023-01-22 RX ADMIN — Medication 200 MCG: at 16:03

## 2023-01-22 RX ADMIN — LIDOCAINE HYDROCHLORIDE 60 MG: 20 INJECTION, SOLUTION EPIDURAL; INFILTRATION; INTRACAUDAL; PERINEURAL at 15:47

## 2023-01-22 RX ADMIN — Medication 4 UNITS: at 21:51

## 2023-01-22 RX ADMIN — ROCURONIUM BROMIDE 20 MG: 10 INJECTION, SOLUTION INTRAVENOUS at 16:08

## 2023-01-22 RX ADMIN — SODIUM CHLORIDE, SODIUM LACTATE, POTASSIUM CHLORIDE, AND CALCIUM CHLORIDE 125 ML: 600; 310; 30; 20 INJECTION, SOLUTION INTRAVENOUS at 19:18

## 2023-01-22 RX ADMIN — Medication 10 MG: at 16:15

## 2023-01-22 RX ADMIN — ACETAMINOPHEN 1000 MG: 500 TABLET ORAL at 21:51

## 2023-01-22 RX ADMIN — HYDROMORPHONE HYDROCHLORIDE 2 MG: 2 INJECTION INTRAMUSCULAR; INTRAVENOUS; SUBCUTANEOUS at 06:53

## 2023-01-22 RX ADMIN — LEVOTHYROXINE SODIUM 25 MCG: 0.03 TABLET ORAL at 10:33

## 2023-01-22 RX ADMIN — ACETAZOLAMIDE 250 MG: 250 TABLET ORAL at 21:51

## 2023-01-22 RX ADMIN — SODIUM CHLORIDE, SODIUM LACTATE, POTASSIUM CHLORIDE, AND CALCIUM CHLORIDE: 600; 310; 30; 20 INJECTION, SOLUTION INTRAVENOUS at 15:32

## 2023-01-22 RX ADMIN — SUGAMMADEX 100 MG: 100 INJECTION, SOLUTION INTRAVENOUS at 17:02

## 2023-01-22 RX ADMIN — PROPOFOL 70 MG: 10 INJECTION, EMULSION INTRAVENOUS at 15:47

## 2023-01-22 RX ADMIN — ONDANSETRON 4 MG: 2 INJECTION INTRAMUSCULAR; INTRAVENOUS at 19:28

## 2023-01-22 RX ADMIN — Medication 100 MCG: at 15:50

## 2023-01-22 RX ADMIN — Medication 200 MCG: at 16:11

## 2023-01-22 RX ADMIN — OXYCODONE HYDROCHLORIDE 5 MG: 5 TABLET ORAL at 21:51

## 2023-01-22 RX ADMIN — SODIUM CHLORIDE: 900 INJECTION, SOLUTION INTRAVENOUS at 16:24

## 2023-01-22 RX ADMIN — DOCUSATE SODIUM 50 MG AND SENNOSIDES 8.6 MG 1 TABLET: 8.6; 5 TABLET, FILM COATED ORAL at 21:50

## 2023-01-22 RX ADMIN — TRANEXAMIC ACID 1 G: 100 INJECTION, SOLUTION INTRAVENOUS at 16:56

## 2023-01-22 RX ADMIN — FENTANYL CITRATE 25 MCG: 50 INJECTION, SOLUTION INTRAMUSCULAR; INTRAVENOUS at 17:31

## 2023-01-22 RX ADMIN — KETAMINE HYDROCHLORIDE 20 MG: 10 INJECTION, SOLUTION INTRAMUSCULAR; INTRAVENOUS at 16:10

## 2023-01-22 RX ADMIN — TRANEXAMIC ACID 1 G: 100 INJECTION, SOLUTION INTRAVENOUS at 16:12

## 2023-01-22 RX ADMIN — INSULIN LISPRO 6 UNITS: 100 INJECTION, SOLUTION INTRAVENOUS; SUBCUTANEOUS at 17:28

## 2023-01-22 RX ADMIN — Medication 100 MCG: at 15:51

## 2023-01-22 RX ADMIN — SODIUM CHLORIDE, PRESERVATIVE FREE 10 ML: 5 INJECTION INTRAVENOUS at 22:53

## 2023-01-22 RX ADMIN — ASPIRIN 81 MG: 81 TABLET, COATED ORAL at 21:50

## 2023-01-22 RX ADMIN — Medication 10 MG: at 16:56

## 2023-01-22 RX ADMIN — CEFAZOLIN 2 G: 10 INJECTION, POWDER, FOR SOLUTION INTRAVENOUS at 22:54

## 2023-01-22 RX ADMIN — ROCURONIUM BROMIDE 20 MG: 10 INJECTION, SOLUTION INTRAVENOUS at 15:56

## 2023-01-22 RX ADMIN — PRAVASTATIN SODIUM 40 MG: 20 TABLET ORAL at 21:50

## 2023-01-22 RX ADMIN — PROPOFOL 50 MG: 10 INJECTION, EMULSION INTRAVENOUS at 15:48

## 2023-01-22 RX ADMIN — CEFAZOLIN 2 G: 1 INJECTION, POWDER, FOR SOLUTION INTRAMUSCULAR; INTRAVENOUS at 16:12

## 2023-01-22 RX ADMIN — MIDAZOLAM 2 MG: 1 INJECTION INTRAMUSCULAR; INTRAVENOUS at 15:37

## 2023-01-22 RX ADMIN — ONDANSETRON HYDROCHLORIDE 4 MG: 2 INJECTION INTRAMUSCULAR; INTRAVENOUS at 16:34

## 2023-01-22 RX ADMIN — ACETAZOLAMIDE 250 MG: 250 TABLET ORAL at 10:33

## 2023-01-22 NOTE — PROGRESS NOTES
Problem: Pressure Injury - Risk of  Goal: *Prevention of pressure injury  Description: Document Uday Scale and appropriate interventions in the flowsheet. Outcome: Progressing Towards Goal  Note: Pressure Injury Interventions:  Sensory Interventions: Minimize linen layers    Moisture Interventions: Absorbent underpads    Activity Interventions: Pressure redistribution bed/mattress(bed type)    Mobility Interventions: Pressure redistribution bed/mattress (bed type)    Nutrition Interventions: Document food/fluid/supplement intake    Friction and Shear Interventions: Minimize layers                Problem: Patient Education: Go to Patient Education Activity  Goal: Patient/Family Education  Outcome: Progressing Towards Goal     Problem: Falls - Risk of  Goal: *Absence of Falls  Description: Document Shaheen Fall Risk and appropriate interventions in the flowsheet. Outcome: Progressing Towards Goal  Note: Fall Risk Interventions:  Mobility Interventions: Bed/chair exit alarm, Strengthening exercises (ROM-active/passive), Utilize walker, cane, or other assistive device         Medication Interventions: Teach patient to arise slowly, Patient to call before getting OOB, Evaluate medications/consider consulting pharmacy, Bed/chair exit alarm    Elimination Interventions: Bed/chair exit alarm, Call light in reach    History of Falls Interventions: Bed/chair exit alarm, Utilize gait belt for transfer/ambulation, Room close to nurse's station         Problem: Patient Education: Go to Patient Education Activity  Goal: Patient/Family Education  Outcome: Progressing Towards Goal     Problem: Risk for Spread of Infection  Goal: Prevent transmission of infectious organism to others  Description: Prevent the transmission of infectious organisms to other patients, staff members, and visitors.   Outcome: Progressing Towards Goal     Problem: Patient Education:  Go to Education Activity  Goal: Patient/Family Education  Outcome: Progressing Towards Goal     Problem: Pain  Goal: *Control of Pain  Outcome: Progressing Towards Goal     Problem: Nausea/Vomiting (Adult)  Goal: *Absence of nausea/vomiting  Outcome: Progressing Towards Goal     Problem: Dysphagia (Adult)  Goal: *Speech Goal: (INSERT TEXT)  Outcome: Progressing Towards Goal     Problem: Fluid Volume - Risk of, Imbalanced  Goal: *Balanced intake and output  Outcome: Progressing Towards Goal

## 2023-01-22 NOTE — PROGRESS NOTES
SLP Note:       Orders received. Follow up attempted. Could not progress with ST rizzo because patient:       []  Lethargic, unable to be alerted enough for safe PO intake  []  Refused participation  [x]  Off the unit  []  NPO for procedure  []  Other:      ST following.     Jodi Martinez M.S., [id number] [facility]  Speech-Language Pathologist

## 2023-01-22 NOTE — PROGRESS NOTES
Hospitalist Progress Note    Patient: Adis Gutierrez MRN: [id number]  CSN: [id number]    YOB: 1955  Age: 79 y.o. Sex: male    DOA: 1/21/2023 LOS:  LOS: 1 day                Assessment/Plan     Patient Active Problem List   Diagnosis Code    Intertrochanteric fracture of left femur ([facility] [address] S72.142A    Di[facility] E11.9    Hypertension I10    Throat cancer ([facility] [address] C14.0    Lung cancer ([facility] [address] C34.90    Tracheostomy in place ([facility] [address] Z93.0    PAD (peripheral artery disease) ([facility] I73.9    Legally blind H54.8    Thrombocytopenia ([facility] D69.6        Chief complaint :  Fall  79 y.o. male with DM, HTN, throat ca, s/p tracheostomy, lung ca under chemo, PAD S/p L fem-pop bypass, R EIA/ SFA stents, legally blind presents to ER with concerns of fall. Left intertrochanteric femur fracture -  Ortho consulted, plan for surgery. DM -  Started on SSI     HTN -  Continue home meds  Monitor BP     History of throat ca -  Tracheostomy in place  Pureed diet. Lung ca -  Under chemotherapy  Followed by Dr. Phil Baltazar     PAD -  S/p L fem-pop bypass, R EIA/ SFA stents. Hold aspirin    Thrombocytopenia -  Secondary to chemotherapy  Platelet transfusion ordered. Legally blind     Post op DVT prophylaxis, PT/OT per ortho. Disposition : 2-3 days    Review of systems  General: No fevers or chills. Cardiovascular: No chest pain or pressure. No palpitations. Pulmonary: No shortness of breath. Gastrointestinal: No nausea, vomiting. Physical Exam:  General: Awake, cooperative, no acute distress    HEENT: NC, Atraumatic. Trach in place  Lungs: CTA Bilaterally. No Wheezing/Rhonchi/Rales. Heart:  S1 S2,  No murmur, No Rubs, No Gallops  Abdomen: Soft, Non distended, Non tender. +Bowel sounds,   Extremities: No c/c/e  Psych:   Not anxious or agitated. Neurologic:  No acute neurological deficit.              Vital signs/Intake and Output:  Visit Vitals  /61   Pulse 97   Temp 98 °F (36.7 °C)   Resp 18   Ht 6' 4\" (1.93 m)   Wt 79.7 kg (175 lb 11.3 oz)   SpO2 100%   BMI 21.39 kg/m²     Current Shift:  01/22 0701 - 01/22 1900  In: 9974 [I.V.:800]  Out: 300   Last three shifts:  01/20 1901 - 01/22 0700  In: 500 [I.V.:500]  Out: 80 [Urine:80]            Labs: Results:       Chemistry Recent Labs     01/21/23  1611   *      K 3.8   *   CO2 17*   BUN 14   CREA 1.11   CA 8.2*   AGAP 9   BUCR 13      CBC w/Diff Recent Labs     01/21/23  1611   WBC 4.9   RBC 2.97*   HGB 8.6*   HCT 25.6*   PLT 79*   GRANS 81*   LYMPH 7*   EOS 0      Cardiac Enzymes No results for input(s): CPK, CKND1, FRANCES in the last 72 hours. No lab exists for component: CKRMB, TROIP   Coagulation Recent Labs     01/22/23  1036   PTP 14.8   INR 1.1   APTT 29.1       Lipid Panel No results found for: CHOL, CHOLPOCT, CHOLX, CHLST, CHOLV, 488152, HDL, HDLP, LDL, LDLC, DLDLP, 816050, VLDLC, VLDL, TGLX, TRIGL, TRIGP, TGLPOCT, CHHD, CHHDX   BNP No results for input(s): BNPP in the last 72 hours. Liver Enzymes No results for input(s): TP, ALB, TBIL, AP in the last 72 hours.     No lab exists for component: SGOT, GPT, DBIL   Thyroid Studies No results found for: T4, T3U, TSH, TSHEXT, TSHEXT     Procedures/imaging: see electronic medical records for all procedures/Xrays and details which were not copied into this note but were reviewed prior to creation of Plan

## 2023-01-22 NOTE — ROUTINE PROCESS
Pt found having actively vomiting. Discussed with Dr. Charmaine Fish, received new orders for IV zofran for nausea/vomiting and IV dilaudid for pain mgmt.

## 2023-01-22 NOTE — H&P
History & Physical    Patient: Shawanda Gonzalez MRN: [id number]  CSN: [id number]    YOB: 1955  Age: 79 y.o. Sex: male      DOA: 1/21/2023  Primary Care Provider:  Clearence Cheadle, MD      Assessment/Plan     Patient Active Problem List   Diagnosis Code    Intertrochanteric fracture of left femur ([address] S72.142A    Diabetes ([address] E11.9    Hypertension I10    Throat cancer ([address] C14.0    Lung cancer ([address] C34.90    Tracheostomy in place ([address] Z93.0    PAD (peripheral artery disease) ([address] I73.9    Legally blind H54.8       Admit to medical floor    Left intertrochanteric femur fracture -  Ortho consulted, plan for surgery. DM -  Started on SSI    HTN -  Continue home meds  Monitor BP    History of throat ca -  Tracheostomy in place  Pureed diet. Lung ca -  Under chemotherapy  Followed by Dr. Margie Soto    PAD -  S/p L fem-pop bypass, R EIA/ SFA stents. Hold aspirin    Legally blind    Post op DVT prophylaxis, PT/OT per ortho. Estimated length of stay : 3 days    CC: fall        HPI:     Shawanda Gonzalez is a 79 y.o. male with DM, HTN, throat ca, s/p tracheostomy, lung ca under chemo, PAD S/p L fem-pop bypass, R EIA/ SFA stents, legally blind presents to ER with concerns of fall. Wife reports patient went to bathroom, while coming back he tripped over sneakers and fell. He complained of pain in left hip and unable to move his left hip. EMS called and he was brought to ER. He denies any loss of consciousness, chest pain, SOB, fever/chills. In ER x-ray showed left intertrochanteric cancer.      Past Medical History:   Diagnosis Date    Diabetes ([address]     Hypertension     not on meds    Lung cancer [facility])     PAD (peripheral artery disease) ([facility] [address]     Throat cancer [facility])        Past Surgical History:   Procedure Laterality Date    HX ORTHOPAEDIC Right 2010    amputation x3 toes     HX ORTHOPAEDIC Left 2013    amputation x2 toes     [place] UNLISTED PROCEDURE VASCULAR SURGERY Right     arterial stent [place] UNLISTED PROCEDURE VASCULAR SURGERY Left     \"replaced an artery\"       History reviewed. No pertinent family history. Social History     Socioeconomic History    Marital status:    Tobacco Use    Smoking status: Former     Types: Cigarettes     Quit date: 2013     Years since quitting: 10.0    Smokeless tobacco: Never   Vaping Use    Vaping Use: Never used   Substance and Sexual Activity    Alcohol use: Not Currently    Drug use: Never       Prior to Admission medications    Medication Sig Start Date End Date Taking? Authorizing Provider   levothyroxine sodium (SYNTHROID PO) Take 25 mg by mouth daily. Yes Other, MD Ophelia   aspirin delayed-release 81 mg tablet Take 81 mg by mouth daily. Yes Provider, Historical   pravastatin (PRAVACHOL) 40 mg tablet Take 40 mg by mouth nightly. Yes Provider, Historical   acetaZOLAMIDE (DIAMOX) 250 mg tablet Take 250 mg by mouth three (3) times daily. Yes Provider, Historical   polysorbate 80/glycerin (REFRESH DRY EYE THERAPY OP) Apply  to eye as needed. Yes Provider, Historical   OTHER 1 % nightly as needed. Neomycin    Left eye   Yes Provider, Historical   acetaminophen (TYLENOL) 325 mg tablet Take 325 mg by mouth every four (4) hours as needed for Pain. Provider, Historical       No Known Allergies    Review of Systems  Gen: No fever, chills, malaise, weight loss/gain. Heent: see above   Heart: No chest pain, palpitations, LEMUS, pnd, or orthopnea. Resp: No cough, hemoptysis, wheezing and shortness of breath. GI: No nausea, vomiting, diarrhea, constipation, melena or hematochezia. : No urinary obstruction, dysuria or hematuria. Derm: No rash, new skin lesion or pruritis. Musc/skeletal: see above. Vasc: No edema, cyanosis or claudication. Endo: No heat/cold intolerance, no polyuria,polydipsia or polyphagia. Neuro: No unilateral weakness, numbness, tingling. No seizures. Heme: No easy bruising or bleeding.           Physical Exam: Physical Exam:  Visit Vitals  /70 (BP 1 Location: Left upper arm, BP Patient Position: Semi fowlers)   Pulse 67   Temp 98.2 °F (36.8 °C)   Resp 18   Ht 6' 4\" (1.93 m)   Wt 77.1 kg (170 lb)   SpO2 94%   BMI 20.69 kg/m²      O2 Device: None (Room air)    Temp (24hrs), Av.1 °F (36.7 °C), Min:97.9 °F (36.6 °C), Max:98.2 °F (36.8 °C)    No intake/output data recorded.  07 -  1900  In: 500 [I.V.:500]  Out: -     General:  Awake, cooperative, no distress. Head:  Normocephalic, without obvious abnormality, atraumatic. Eyes:  Conjunctivae clear, sclera anicteric,. Nose: Nares normal. No drainage or sinus tenderness. Throat: Lips, mucosa, and tongue normal.    Neck: tracheostomy       Lungs:   Clear to auscultation bilaterally. Heart:   S1, S2, no murmur, click, rub or gallop. Abdomen: Soft, non-tender. Bowel sounds normal. No masses,  No organomegaly. Extremities: Extremities normal, atraumatic, no cyanosis or edema. Capillary refill normal.   Pulses: 2+ and symmetric all extremities. Skin: Skin color pink, turgor normal. No rashes or lesions   Neurologic: CNII-XII intact. No focal motor or sensory deficit.        Labs Reviewed:    CMP:   Lab Results   Component Value Date/Time     2023 04:11 PM    K 3.8 2023 04:11 PM     (H) 2023 04:11 PM    CO2 17 (L) 2023 04:11 PM    AGAP 9 2023 04:11 PM     (H) 2023 04:11 PM    BUN 14 2023 04:11 PM    CREA 1.11 2023 04:11 PM    CA 8.2 (L) 2023 04:11 PM     CBC:   Lab Results   Component Value Date/Time    WBC 4.9 2023 04:11 PM    HGB 8.6 (L) 2023 04:11 PM    HCT 25.6 (L) 2023 04:11 PM    PLT 79 (L) 2023 04:11 PM         Procedures/imaging: see electronic medical records for all procedures/Xrays and details which were not copied into this note but were reviewed prior to creation of Plan    Please note that this dictation was completed with Dragon, the computer voice recognition software. Quite often unanticipated grammatical, syntax, homophones, and other interpretive errors are inadvertently transcribed by the computer software. Please disregard these errors. Please excuse any errors that have escaped final proofreading.         CC: Erin Harper MD

## 2023-01-22 NOTE — PERIOP NOTES
TRANSFER - OUT REPORT:    Verbal report given to Pat(name) on Cherrie Pan  being transferred to [facility](unit) for routine post - op       Report consisted of patients Situation, Background, Assessment and   Recommendations(SBAR). Information from the following report(s) SBAR, Kardex, OR Summary, Procedure Summary, Intake/Output, and MAR was reviewed with the receiving nurse. Lines:   Single Lumen Venous Catheter accessed port with a 20 willoughby needle flushed easily and blood levi easily Right Subclavian (Active)   Central Line Being Utilized Yes 01/21/23 1622   Site Assessment Clean, dry, & intact 01/21/23 1622   Date of Last Dressing Change 01/21/23 01/21/23 1622   Dressing Status Clean, dry, & intact 01/21/23 1622       Peripheral IV 01/22/23 Posterior;Right Hand (Active)   Site Assessment Clean, dry, & intact 01/22/23 1719   Phlebitis Assessment 0 01/22/23 1719   Infiltration Assessment 0 01/22/23 1719   Dressing Type Transparent 01/22/23 1719        Opportunity for questions and clarification was provided.       Patient transported with:   O2 @ 5 liters  Registered Nurse

## 2023-01-22 NOTE — PROGRESS NOTES
Pt requested pain medication via writing. Patient states that at his last hospital visit they gave him something for urinary issues.      1x attempt of catheter made by charge RN

## 2023-01-22 NOTE — ROUTINE PROCESS
No charted urine output or noted any soiled bedpad. Bladder scan result of 654 mL. Pt offered urinal, voided 80 mL. PVR bladder scan result of 595 mL. Paged Dr. Stewart Chauhan, waiting callback.    4705 Received order to straight cath once and to do bladder checks per shift.

## 2023-01-22 NOTE — ROUTINE PROCESS
Bedside and Verbal shift change report given to National Shania RN (oncoming nurse) by Elba Cedillo RN (offgoing nurse). Report included the following information SBAR, Kardex, ED Summary, Intake/Output, MAR, and Recent Results.

## 2023-01-22 NOTE — CONSULTS
Consultation Note    Assessment:   79 y.o. male admitted on 1/21/2023 for Intertrochanteric fracture of left femur ([facility] [address] Rachel Ricoone    Plan: This is a 59-year-old male with a history of lung carcinoma, status post tracheostomy, peripheral vascular disease and diabetes. He also is legally blind. He unfortunately had a ground-level fall at his home yesterday. He normally ambulates with an assistive device around his apartment. He developed left hip pain and was found to have a left intertrochanteric femur fracture. He also has a history of liver disease. Decision was made to preoperatively administer platelets as this was found to be 79. Risks and benefits of left hip cephalomedullary nailing was discussed with the patient as well as his wife at bedside. This includes damage to nerves and blood vessels, revision surgery, fracture. The patient's consent obtained from the wife. Medical clearance obtained from the hospital staff as well as cardiology consult. Subjective: This is a 59-year-old male with history of lung carcinoma, tracheostomy, diabetes and peripheral vascular disease. He was found to have a left intertrochanteric femur fracture after a ground-level fall at his home. He is accompanied by his wife today. He does have also legal blindness. But ambulates with assistive device around his home. No Known Allergies    [unfilled]    Past Medical History:   Diagnosis Date    Diabetes ([facility] [address]     Hypertension     not on meds    Lung cancer ([facility] [address]     PAD (peripheral artery disease) ([facility] [address]     Throat cancer ([facility] [address]      Past Surgical History:   Procedure Laterality Date    HX ORTHOPAEDIC Right 2010    amputation x3 toes     HX ORTHOPAEDIC Left 2013    amputation x2 toes     [place] UNLISTED PROCEDURE VASCULAR SURGERY Right     arterial stent     KY UNLISTED PROCEDURE VASCULAR SURGERY Left     \"replaced an artery\"     History reviewed. No pertinent family history.   [unfilled]    Review of Systems:  General--Negative for fatigue, weight loss, anorexia  Cardiovascular--Negative for CP, orthopnea, PND  Endocrine--Negative for polyuria, polydipsia, cold intolerance    Vitals:    01/22/23 0005 01/22/23 0327 01/22/23 0819 01/22/23 1245   BP: 110/62 96/62 100/66 114/63   Pulse: 70 76 86 84   Resp: 18 18 17 17   Temp: 98.2 °F (36.8 °C) 98.4 °F (36.9 °C) 99.4 °F (37.4 °C) 98 °F (36.7 °C)   SpO2: 98% 100% 100% 100%   Weight:  79.7 kg (175 lb 11.3 oz)     Height:           Physical Exam: General Appearance: Alert and Oriented x3. No acute distress. Conversant. Age-appropriate. Normal mood and affect. Normal inspiratory and expiratory effort. Musculoskeletal:   LEFT Lower extremity:  Skin: intact with multiple healed medial incisions  Tenderness to palpation left proximal femur  Motor intact knee flexion/extension, ankle plantarflexion and dorsiflexion, toes flex and extend. Sensory intact L4-S1  Posterior Tibial Pulse 2+, Dorsalis Pedis Pulse 2+, Capillary Refill <2  Compartments soft  No significant edema  Negative Mau's Sign       Recent Labs     01/21/23  1611   WBC 4.9   HCT 25.6*   MCV 86.2     Recent Labs     01/22/23  1036 01/21/23  1611   NA  --  141   CO2  --  17*   BUN  --  14   INR 1.1  --      No results for input(s): ALBUMIN, AGRAT in the last 72 hours. No lab exists for component: TOTPR, SGOTAST, ALKPHOS, BILIT, BILID, SGPTALT, GLOBULIN  No results for input(s): UGLU in the last 72 hours. No lab exists for component: Thony Michelle, UKET, USPG, UOCB, UPH, UPSC, UUROBILISQ, UNITR, URINELEUKOC  @ZGLUCOSEBEDSIDE(glupoc:8)@    Radiographs: 3 views of the left hip, AP pelvis, AP hip crosstable lateral show a stable intertrochanteric left femur fracture.     Clayton Goff,

## 2023-01-22 NOTE — PROGRESS NOTES
Problem: Pressure Injury - Risk of  Goal: *Prevention of pressure injury  Description: Document Uday Scale and appropriate interventions in the flowsheet. Outcome: Progressing Towards Goal  Note: Pressure Injury Interventions:  Sensory Interventions: Assess changes in LOC, Avoid rigorous massage over bony prominences, Check visual cues for pain, Keep linens dry and wrinkle-free, Minimize linen layers    Moisture Interventions: Absorbent underpads, Assess need for specialty bed, Minimize layers    Activity Interventions: Pressure redistribution bed/mattress(bed type), PT/OT evaluation (PT/OT eval postop)    Mobility Interventions: Pressure redistribution bed/mattress (bed type), PT/OT evaluation (PT/OT eval postop)    Nutrition Interventions: Document food/fluid/supplement intake, Offer support with meals,snacks and hydration    Friction and Shear Interventions: Minimize layers                Problem: Falls - Risk of  Goal: *Absence of Falls  Description: Document Shaheen Fall Risk and appropriate interventions in the flowsheet. Outcome: Progressing Towards Goal  Note: Fall Risk Interventions:  Mobility Interventions: Bed/chair exit alarm, Patient to call before getting OOB, Utilize walker, cane, or other assistive device         Medication Interventions: Assess postural VS orthostatic hypotension, Bed/chair exit alarm, Patient to call before getting OOB, Teach patient to arise slowly    Elimination Interventions: Bed/chair exit alarm, Call light in reach, Patient to call for help with toileting needs, Toileting schedule/hourly rounds, Urinal in reach    History of Falls Interventions: Bed/chair exit alarm, Door open when patient unattended, Room close to nurse's station         Problem: Risk for Spread of Infection  Goal: Prevent transmission of infectious organism to others  Description: Prevent the transmission of infectious organisms to other patients, staff members, and visitors.   Outcome: Progressing Towards Goal     Problem: Pain  Goal: *Control of Pain  Outcome: Progressing Towards Goal     Problem: Nausea/Vomiting (Adult)  Goal: *Absence of nausea/vomiting  Outcome: Progressing Towards Goal     Problem: Dysphagia (Adult)  Goal: *Speech Goal: Absence of aspiration  Outcome: Progressing Towards Goal     Problem: Fluid Volume - Risk of, Imbalanced  Goal: *Balanced intake and output  Outcome: Progressing Towards Goal

## 2023-01-22 NOTE — OP NOTES
[address] [place] [address], [phone]     Intramedullary Nail Fixation of HIP      Patient: Cassie Fine MRN: [id number]  SSN: [id number]    YOB: 1955  Age: 79 y.o. Sex: male      Date of Surgery: 1/22/2023  Incision Time: 16:13  Antibiotic Infusion Time: 0277  Preoperative Diagnosis: FRACTURED LEFT HIP   Postoperative Diagnosis: FRACTURED LEFT HIP   Location: [facility]  Surgeon: Juan Henson DO  Assistant: Circ-1: Nikia Gale RN  Radiology Technician: Maribell Rico  Scrub Tech-1: Astrid Roe  Surg Asst-1: Kelsey Fernandez, was medically necessary for holding of retractors for exposure and to complete the case. Anesthesia: general    Procedure: Left Hip Intramedullary Nail Fixation    Findings: Closed displaced Left Intertrochanteric Hip Fracture    Estimated Blood Loss: 300 mL    Specimens: left femur reamings sent to pathology    Complications: None    Implants:   Implant Name Type Inv. Item Serial No.  Lot No. LRB No. Used Action   NAIL IM L170MM [id number] 130DEG SHT PROX FEM GRN TI SADIE NYASIA - [id number]  NAIL IM L170MM [id number] 130DEG SHT PROX FEM GRN TI SADIE NYASIA  [employer]WD [id number] Left 1 Implanted   SCREW BNE L120MM DIA10.35MM G TI SADIE PERF FOR PROX FEM - [id number]  SCREW BNE L120MM DIA10.35MM G TI SADIE PERF FOR PROX FEM  DEPU[employer]WD [id number] Left 1 Implanted   SCREW BNE L38MM DIA5MM TIB LT GRN TI ST SADIE JW FULL THRD - [id number]  SCREW BNE L38MM DIA5MM TIB LT GRN TI ST SADIE JW FULL THRD  DEPU[employer]WD [id number] Left 1 Implanted       Patient Condition: Stable. INDICATIONS: This 79y.o.-year-old male with a past medical history of lung carcinoma, tracheostomy, vascular disease, diabetes injured the Left hip in a fall 1 days ago. Patient is a community ambulator and uses assistive devices.     Surgical fixation with a Left femoral intramedullary nail fixation has been recommended. The risks and the possible complications of this surgery and anesthesia including, but not exclusive to, bleeding, infection, fracture, deep venous thrombosis, pulmonary embolus, nerve and vascular injury, delayed union, nonunion, possible need for other procedures, and possibly death have been explained to the patient. The patient accepts these risks for the benefits of surgical intervention over non-operative care. The patients medical problems have been addressed by the medical doctor, cardiologiest. and anesthesiologist  and are deemed stable and as well controlled as possible. Inpatient hospital care was medically necessary, reasonable, and appropriate. 1 unit of platelets was administered secondary to thrombocytopenia with a count of 79. This is a medically necessary procedure. As such, without a use of a surgical assistant to retract soft tissues, protect vital neuro-vascular structures and assist in the technical aspects of the operation, this procedure would not have been possible. Therefore this assistant was medically necessary. This patient has no local or systemic contraindications to surgery. DESCRIPTION OF FINDINGS:  Left  closed comminuted intertrochanteric hip fracture. DESCRIPTION OF PROCEDURE:    After the risks and benefits of surgery were discussed, informed consent was obtained. The patient was identified in the preoperative holding area and the operative extremity was marked. The patient was taken to the operating room and placed in the supine position. General anesthesia was performed. IV antibiotics were given. A Baldwin catheter was already in place. After verification of the appropriate operative site from the consent form, with confirmation of surgeon, anesthesia and nursing teams, the patient remained in the supine position; the bony prominences were well padded on the fracture table with the opposite leg in the well-leg abdi.      Prior to prepping, using traction and rotation under Fluoroscopy, the reduction was obtained on the thuan table. The Left leg was prepped and draped in a sterile fashion using Chloraprep. A formal timeout was performed. After confirmation of bony landmarks with fluoroscopy skin incision was made over the lateral aspect of the hip. Using Fluoroscopy the starting point for the starting pin was found and the pin inserted into greater trochanter according to the Synthes technique guide. A opening reamer was used to open up the proximal femoral entry hole. The pin was removed. Beaded guidewire was placed through the entry hole and advanced to the level of the knee. Fluoroscopy was used to confirm passage through the bone and past the fracture site. No reaming was used as the patient had large corticies. A 12 mm short nail was chosen. A 125 degree nail was chosen for this patient. The nail was fixed to the incrediblue  proximally and advanced into place. The reduction of the fracture was acceptable at this point. Using the drill guides on the insertion handle, the area requiring skin incision was estimated for the proximal lag screw. Using the drill guide first the guidewire was placed centrally in the head cheating a little inferiorly along the calcar. This was measured and reamed to 120 mm. Screw was placed. Distally the area requiring skin incision was made for the distal screw followed by spreading the tissue down to the bone with a tonsil prior to drilling, measuring 38mm and screw placement in a static fashion. Fluoroscopic images were obtained to confirm screw length, fracture reduction and rotation at the knee and hip. The proximal handle was removed from the femoral nail. The incisions were copiously irrigated. A layered closure of subcutaneous tissue with 0 vicryl and skin with 2-0 vicryl was performed followed by skin closure staples.   Interrupted 2-0 vicryl stitches were placed into the screw hole incisions followed by staples. Sterile dressings were applied. The patient was repositioned supine and awakened from anesthesia. All sponge and needle counts were correct.     Adan King DO

## 2023-01-23 PROBLEM — D64.9 ANEMIA: Status: ACTIVE | Noted: 2023-01-23

## 2023-01-23 LAB
ANION GAP SERPL CALC-SCNC: 7 MMOL/L (ref 3–18)
BLD PROD TYP BPU: NORMAL
BPU ID: NORMAL
BUN SERPL-MCNC: 25 MG/DL (ref 7–18)
BUN/CREAT SERPL: 19 (ref 12–20)
CALCIUM SERPL-MCNC: 7.8 MG/DL (ref 8.5–10.1)
CALLED TO:,BCALL1: NORMAL
CHLORIDE SERPL-SCNC: 115 MMOL/L (ref 100–111)
CO2 SERPL-SCNC: 20 MMOL/L (ref 21–32)
CREAT SERPL-MCNC: 1.29 MG/DL (ref 0.6–1.3)
ERYTHROCYTE [DISTWIDTH] IN BLOOD BY AUTOMATED COUNT: 18.8 % (ref 11.6–14.5)
GLUCOSE BLD STRIP.AUTO-MCNC: 125 MG/DL (ref 70–110)
GLUCOSE BLD STRIP.AUTO-MCNC: 163 MG/DL (ref 70–110)
GLUCOSE BLD STRIP.AUTO-MCNC: 165 MG/DL (ref 70–110)
GLUCOSE BLD STRIP.AUTO-MCNC: 174 MG/DL (ref 70–110)
GLUCOSE SERPL-MCNC: 172 MG/DL (ref 74–99)
HCT VFR BLD AUTO: 14.6 % (ref 36–48)
HCT VFR BLD AUTO: 17.4 % (ref 36–48)
HGB BLD-MCNC: 4.8 G/DL (ref 13–16)
HGB BLD-MCNC: 5.7 G/DL (ref 13–16)
HISTORY CHECKED?,CKHIST: NORMAL
MCH RBC QN AUTO: 28.9 PG (ref 24–34)
MCHC RBC AUTO-ENTMCNC: 32.9 G/DL (ref 31–37)
MCV RBC AUTO: 88 FL (ref 78–100)
NRBC # BLD: 0 K/UL (ref 0–0.01)
NRBC BLD-RTO: 0 PER 100 WBC
PLATELET # BLD AUTO: 62 K/UL (ref 135–420)
PMV BLD AUTO: 10.9 FL (ref 9.2–11.8)
POTASSIUM SERPL-SCNC: 4.2 MMOL/L (ref 3.5–5.5)
RBC # BLD AUTO: 1.66 M/UL (ref 4.35–5.65)
SODIUM SERPL-SCNC: 142 MMOL/L (ref 136–145)
STATUS OF UNIT,%ST: NORMAL
UNIT DIVISION, %UDIV: 0
WBC # BLD AUTO: 4.8 K/UL (ref 4.6–13.2)

## 2023-01-23 PROCEDURE — 74011250636 HC RX REV CODE- 250/636: Performed by: ORTHOPAEDIC SURGERY

## 2023-01-23 PROCEDURE — 85018 HEMOGLOBIN: CPT

## 2023-01-23 PROCEDURE — 97530 THERAPEUTIC ACTIVITIES: CPT

## 2023-01-23 PROCEDURE — 74011250637 HC RX REV CODE- 250/637: Performed by: ORTHOPAEDIC SURGERY

## 2023-01-23 PROCEDURE — 36415 COLL VENOUS BLD VENIPUNCTURE: CPT

## 2023-01-23 PROCEDURE — 82962 GLUCOSE BLOOD TEST: CPT

## 2023-01-23 PROCEDURE — P9016 RBC LEUKOCYTES REDUCED: HCPCS

## 2023-01-23 PROCEDURE — 65270000032 HC RM SEMIPRIVATE

## 2023-01-23 PROCEDURE — 36430 TRANSFUSION BLD/BLD COMPNT: CPT

## 2023-01-23 PROCEDURE — 74011000250 HC RX REV CODE- 250: Performed by: ORTHOPAEDIC SURGERY

## 2023-01-23 PROCEDURE — 97161 PT EVAL LOW COMPLEX 20 MIN: CPT

## 2023-01-23 PROCEDURE — 92526 ORAL FUNCTION THERAPY: CPT

## 2023-01-23 PROCEDURE — 99222 1ST HOSP IP/OBS MODERATE 55: CPT | Performed by: NURSE PRACTITIONER

## 2023-01-23 PROCEDURE — 92610 EVALUATE SWALLOWING FUNCTION: CPT

## 2023-01-23 PROCEDURE — 74011636637 HC RX REV CODE- 636/637: Performed by: ORTHOPAEDIC SURGERY

## 2023-01-23 PROCEDURE — 80048 BASIC METABOLIC PNL TOTAL CA: CPT

## 2023-01-23 PROCEDURE — 97167 OT EVAL HIGH COMPLEX 60 MIN: CPT

## 2023-01-23 PROCEDURE — 85027 COMPLETE CBC AUTOMATED: CPT

## 2023-01-23 RX ORDER — SODIUM CHLORIDE 9 MG/ML
250 INJECTION, SOLUTION INTRAVENOUS AS NEEDED
Status: DISCONTINUED | OUTPATIENT
Start: 2023-01-23 | End: 2023-01-24

## 2023-01-23 RX ADMIN — ACETAMINOPHEN 1000 MG: 500 TABLET ORAL at 13:20

## 2023-01-23 RX ADMIN — SODIUM CHLORIDE, PRESERVATIVE FREE 10 ML: 5 INJECTION INTRAVENOUS at 06:00

## 2023-01-23 RX ADMIN — FERROUS SULFATE TAB 325 MG (65 MG ELEMENTAL FE) 325 MG: 325 (65 FE) TAB at 08:25

## 2023-01-23 RX ADMIN — CEFAZOLIN 2 G: 10 INJECTION, POWDER, FOR SOLUTION INTRAVENOUS at 08:25

## 2023-01-23 RX ADMIN — Medication 2 UNITS: at 11:30

## 2023-01-23 RX ADMIN — ACETAZOLAMIDE 250 MG: 250 TABLET ORAL at 22:30

## 2023-01-23 RX ADMIN — OXYCODONE HYDROCHLORIDE 5 MG: 5 TABLET ORAL at 03:48

## 2023-01-23 RX ADMIN — Medication 2 UNITS: at 07:30

## 2023-01-23 RX ADMIN — SODIUM CHLORIDE, PRESERVATIVE FREE 10 ML: 5 INJECTION INTRAVENOUS at 14:00

## 2023-01-23 RX ADMIN — LEVOTHYROXINE SODIUM 25 MCG: 0.03 TABLET ORAL at 08:25

## 2023-01-23 RX ADMIN — DOCUSATE SODIUM 50 MG AND SENNOSIDES 8.6 MG 1 TABLET: 8.6; 5 TABLET, FILM COATED ORAL at 22:30

## 2023-01-23 RX ADMIN — DOCUSATE SODIUM 50 MG AND SENNOSIDES 8.6 MG 1 TABLET: 8.6; 5 TABLET, FILM COATED ORAL at 08:25

## 2023-01-23 RX ADMIN — ONDANSETRON 4 MG: 2 INJECTION INTRAMUSCULAR; INTRAVENOUS at 08:25

## 2023-01-23 RX ADMIN — ACETAZOLAMIDE 250 MG: 250 TABLET ORAL at 08:25

## 2023-01-23 RX ADMIN — ACETAZOLAMIDE 250 MG: 250 TABLET ORAL at 17:30

## 2023-01-23 RX ADMIN — Medication 2 UNITS: at 16:30

## 2023-01-23 RX ADMIN — SODIUM CHLORIDE, PRESERVATIVE FREE 10 ML: 5 INJECTION INTRAVENOUS at 22:30

## 2023-01-23 RX ADMIN — PRAVASTATIN SODIUM 40 MG: 20 TABLET ORAL at 22:30

## 2023-01-23 NOTE — PROGRESS NOTES
Problem: Dysphagia (Adult)  Description: Patient will:  1. Tolerate PO trials with 0 s/s overt distress in 4/5 trials. 2. Utilize compensatory swallow strategies/maneuvers (decrease bite/sip, size/rate, alt. liq/sol) with min cues in 4/5 trials. 3. Perform oral-motor/laryngeal exercises to increase oropharyngeal swallow function with min cues. 4. Complete an objective swallow study (i.e., MBSS) to assess swallow integrity, r/o aspiration, and determine of safest LRD, min A.    Rec:     Easy to chew, thin liquid - alternate solids/liquids  Pt is a feeder  Aspiration precautions  HOB >45 during po intake, remain >30 for 30-45 minutes after po   Small bites/sips; alternate liquid/solid with slow feeding rate   Oral care TID  Meds per pt preference   Outcome: Progressing Towards Goal    SPEECH LANGUAGE PATHOLOGY BEDSIDE SWALLOW   EVALUATION & TREATMENT     Patient: Adis Gutierrez (11 y.o. male)  Date: 1/23/2023  Primary Diagnosis: Intertrochanteric fracture of left femur (HCC) [S72.142A]  Procedure(s) (LRB):  FEMUR INSERTION INTRA MEDULLARY NAIL, LEFT WITH C-ARM (Left) 1 Day Post-Op   Precautions:Fall, WBAT (Blind/nonverbal)  PLOF: as per H&P    ASSESSMENT :  Based on the objective data described below, the patient presents with mild oral and suspected pharyngeal phase dysphagia. Patient is s/p total laryngectomy and total thyroidectomy. History of feeding tube/dysphagia/aspiration but currently eats by mouth. Patient communicates via white board. SLP called spouse to confirm current diet/ history of swallowing difficulties. Patient's spouse reports that he typically eats soft foods but has no problem swallowing. OM examination completed. Patient is edentulous but reports that he typically wears dentures. Patient reports that he drinks water after each bite of solid. Trials of thin, puree, and mech soft textures given. Oral phase c/b prolonged mastication with mech soft textures 2/ dentition.  Patient given sips of water after each bite of puree/mech soft texture to help clear. No overt s/sx of distress visualized during PO trials. Rec easy to chew, thin liquid diet. Alternate solids/liquids. Slow rate. Small bites/sips. Complete MBSS next day to assess swallow function. D/w RN and Dr. Bola Thomas to obtain order. TREATMENT :  Skilled therapy initiated; Educated pt on aspiration precautions and importance of compensatory swallow techniques to decrease aspiration risk (decrease rate of intake & sip/bite size, upright @HOB for all po intake and ~30 minutes after po); verbalized comprehension. SLP to follow as indicated. Patient will benefit from skilled intervention to address the above impairments. Patient's rehabilitation potential is considered to be Fair  Factors which may influence rehabilitation potential include:   []            None noted  []            Mental ability/status  [x]            Medical condition  []            Home/family situation and support systems  []            Safety awareness  []            Pain tolerance/management  []            Other:      PLAN :  Recommendations and Planned Interventions:  See above  Frequency/Duration: Patient will be followed by speech-language pathology 3 times a week to address goals. Discharge Recommendations: To Be Determined     SUBJECTIVE:   Patient stated I usually wear teeth.     OBJECTIVE:     Past Medical History:   Diagnosis Date    Diabetes ([address]     Hypertension     not on meds    Lung cancer ([facility] [address]     PAD (peripheral artery disease) ([address]     Throat cancer ([facility] [address]      Past Surgical History:   Procedure Laterality Date    HX ORTHOPAEDIC Right 2010    amputation x3 toes     HX ORTHOPAEDIC Left 2013    amputation x2 toes     [place] UNLISTED PROCEDURE VASCULAR SURGERY Right     arterial stent     [place] UNLISTED PROCEDURE VASCULAR SURGERY Left     \"replaced an artery\"     Home Situation:   Home Situation  Home Environment: Apartment  # Steps to Enter: 0  One/Two Story Residence: One story  Living Alone: No  Support Systems: Child(rich), Spouse/Significant Other  Patient Expects to be Discharged to[de-identified] Home with home health (vs SNF)  Current DME Used/Available at Home: Cane, quad, Walker, rolling  Tub or Shower Type: Tub/Shower combination    Diet prior to admission: Soft foods, thin liquids (per spouse)  Current Diet:  Regular, thin     Cognitive and Communication Status:  Neurologic State: Alert  Orientation Level: Unable to verbalize  Cognition: Follows commands  Perception: Verbal, Tactile  Perseveration: No perseveration noted  Safety/Judgement: Awareness of environment  Oral Assessment:  Oral Assessment  Labial: No impairment  Dentition: Edentulous (normally wears dentures)  Oral Hygiene:  (fair)  Lingual: No impairment  Velum: Unable to visualize  Mandible: No impairment  Gag Reflex:  (did not test)  P.O. Trials:  Patient Position:  (HOB >45)  Vocal quality prior to P.O.: Aphonic  Consistency Presented: Thin liquid;Puree;Mechanical soft  How Presented: SLP-fed/presented;Spoon;Straw     Bolus Acceptance: No impairment  Bolus Formation/Control: Impaired  Type of Impairment: Incomplete;Lip closure;Mastication  Propulsion: No impairment  Oral Residue: None  Initiation of Swallow: No impairment  Laryngeal Elevation: Other (comment) (s/p total laryngectomy)  Aspiration Signs/Symptoms: None  Pharyngeal Phase Characteristics: Other (comment) (no overt s/sx)  Effective Modifications:  Alternate liquids/solids;Small sips and bites  Cues for Modifications: Minimal       Oral Phase Severity: Mild  Pharyngeal Phase Severity :  (suspect)    PAIN:  Pain level pre-treatment: none reported  Pain level post-treatment: none reported    After treatment:   []            Patient left in no apparent distress sitting up in chair  [x]            Patient left in no apparent distress in bed  [x]            Call bell left within reach  [x]            Nursing notified  [x]            Family present  [x] Caregiver present - contacted via telephone  []            Bed alarm activated    COMMUNICATION/EDUCATION:   [x]            Aspiration precautions; swallow safety; compensatory techniques. [x]            Patient/family have participated as able in goal setting and plan of care. []            Patient/family agree to work toward stated goals and plan of care. []            Patient understands intent and goals of therapy; neutral about participation. []            Patient unable to participate in goal setting/plan of care; educ ongoing with interdisciplinary staff  []         Posted safety precautions in patient's room.     Thank you for this referral.  ELVIE Traore  Time Calculation: 23 mins  Evaluation Time: 15 minutes   Treatment Time: 8 minutes

## 2023-01-23 NOTE — PROGRESS NOTES
Speech Therapy Evaluation Attempt     Chart reviewed. Attempted Speech Therapy Evaluation, however, patient unable to be seen due to:  [x]  Nausea/vomiting - attempt 9:25; patient wrote on white board that he has been vomiting this morning and requested that SLP return later  []  Eating  []  Pain  []  Patient too lethargic  []  Off Unit for testing/procedure  []  Dialysis treatment in progress  []  Telemetry Results  []  Other:      Will f/u later as patient's schedule allows.   Leola Lopez, SLP

## 2023-01-23 NOTE — ACP (ADVANCE CARE PLANNING)
Advance Care Planning     General Advance Care Planning (ACP) Conversation    Date of Conversation: 1/23/2023  Conducted with: Patient with Decision Making Capacity    Healthcare Decision Maker:     Primary Decision Maker: Genesis Patel - Spouse - [phone]    Today we documented Decision Maker(s) consistent with Legal Next of Kin hierarchy. Content/Action Overview:   Has NO ACP documents/care preferences -not interested in completing AMD at this time. Reviewed DNR/DNI and patient elects Full Code (Attempt Resuscitation)    Length of Voluntary ACP Conversation in minutes:  <16 minutes (Non-Billable)    1110 Seen today in room 301 along with Matheus Walden NP. Lying in bed with head of bed elevated. Daughter, Finn House, at bedside Awake, alert. Oriented x 4 Respirations unlabored. 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 Uses white board for communication along with thumbs up/down and nodding/shaking head. States having some left leg pain at surgical site. Came to the ED 1/21/2023 from home per EMS after sustaining a fall at home. Found to have LEFT femur fracture. 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 Lives in a single family home with his wife, Annel Ordonez. The have four children, Rae Carlisle, and Susan. All children are in contact with their parents. Prior to admission, he was independent in ADLs. Uses a walker and cane for ambulation assistance. He does not have an AMD on file.  He accepts his wife, Radha Hull, as legal decision maker if he was unable to make his own decisions. Reviewed code status choices of DNR/DNI vs full code. He is open to all methods of resuscitation in the event of cardiac arrest or pre-arrest respiratory decline. Described the four components of cardiac resuscitation: compressions, medications, electric shocks, and intubation/ventilation. Reviewed benefits and burdens to include fractured ribs, punctured lungs, hypoxic brain injury, and long-term ventilation. Disposition plan: he is hoping to go home for home rehabilitation. After meeting with patient and Fordeanna Basilio, daughter, the goals of care have been defined. Code status remains: FULL CODE. AMD status: None on file. His wife, Radha Hull is closet living relative and will be decision maker if he is unable   Will sign off but remain available for reconsult as needed/if appropriate.      Thank you for the Palliative Medicine consult and allowing us to participate in the care of Hakan Parkinson RN, MSN  Palliative Medicine   [phone]

## 2023-01-23 NOTE — PROGRESS NOTES
D/C Plan: AM anticipates discharge to SNF pending insurance auth, acceptance and bed availability    CM notes patient had surgery: Femur insertion intra medullary nail, left with c-arm. CM notes patient has tracheostomy and H&P indicates he is undergoing chemotherapy, please note few SNFs will accept patient with tracheostomy and patient would need to be willing to put chemotherapy on hold during SNF stay. CM to confirm age of tracheostomy with patient/family and if patient has supplies that can be provided to assist with acceptance.

## 2023-01-23 NOTE — PROGRESS NOTES
D/C Plan: [place] with physician follow up vs SNF    Reason for Admission:   fall                    RUR Score:     mod; 16%             PCP: First and Last name:   Sully Keller MD     Name of Practice:    Are you a current patient: Yes/No:    Approximate date of last visit:    Can you participate in a virtual visit if needed:     Do you (patient/family) have any concerns for transition/discharge? Plan for utilizing home health:       Current Advanced Directive/Advance Care Plan:  Full Code      Healthcare Decision Maker:   Click here to complete [address] including selection of the Healthcare Decision Maker Relationship (ie \"Primary\")            Primary Decision Maker: Gali Chandra - Spouse - [phone]    Transition of Care Plan:      [place] with physician follow up vs SNF pending clinical progression    Chart reviewed. Per H&P Dede Jhaveri is a 79 y.o. male with DM, HTN, throat ca, s/p tracheostomy, lung ca under chemo, PAD S/p L fem-pop bypass, R EIA/ SFA stents, legally blind presents to ER with concerns of fall. Wife reports patient went to bathroom, while coming back he tripped over sneakers and fell. He complained of pain in left hip and unable to move his left hip. EMS called and he was brought to ER. He denies any loss of consciousness, chest pain, SOB, fever/chills. In ER x-ray showed left intertrochanteric cancer. \"      CM and provider rounded with pt and his daughter at bedside to discuss care transition options. CM discussed [place] vs SNF. Pt has indicated, via white board, that [facility] CM provided family with a list of both [place] agencies and SNF facilities to refer to. Pt's daughter will discuss with pt's wife and other daughter to assist with care transition. Please note, if SNF is selected, chemo treatment for lung cancer would have to be on hold until the completion of rehab.  CM to continue to follow and assist.    Care Management Interventions  Transition of Care Consult (CM Consult): Discharge Planning  Health Maintenance Reviewed: Yes  Physical Therapy Consult: Yes  Occupational Therapy Consult: Yes  Speech Therapy Consult: Yes  Support Systems: Child(rich), Spouse/Significant Other  Confirm Follow Up Transport: Family  The Plan for Transition of Care is Related to the Following Treatment Goals : [facility] with physician follow up vs SNF pending clinical progression  The Patient and/or Patient Representative was Provided with a Choice of Provider and Agrees with the Discharge Plan?: Yes  Name of the Patient Representative Who was Provided with a Choice of Provider and Agrees with the Discharge Plan: spouse  Freedom of Choice List was Provided with Basic Dialogue that Supports the Patient's Individualized Plan of Care/Goals, Treatment Preferences and Shares the Quality Data Associated with the Providers?: Yes  Discharge Location  Patient Expects to be Discharged to[de-identified] Home with home health (vs SNF)

## 2023-01-23 NOTE — ANESTHESIA POSTPROCEDURE EVALUATION
Post-Anesthesia Evaluation and Assessment    Cardiovascular Function/Vital Signs  Visit Vitals  /62 (BP 1 Location: Left upper arm, BP Patient Position: Semi fowlers)   Pulse 96   Temp 36.4 °C (97.5 °F)   Resp 16   Ht 6' 4\" (1.93 m)   Wt 79.7 kg (175 lb 11.3 oz)   SpO2 100%   BMI 21.39 kg/m²       Patient is status post Procedure(s): FEMUR INSERTION INTRA MEDULLARY NAIL, LEFT WITH C-ARM. Nausea/Vomiting: Controlled. Postoperative hydration reviewed and adequate. Pain:  Pain Scale 1: Numeric (0 - 10) (01/22/23 1814)  Pain Intensity 1: 0 (01/22/23 1814)   Managed. Neurological Status:   Neuro (WDL): Exceptions to WDL (01/22/23 1814)   At baseline. Mental Status and Level of Consciousness: Baseline and appropriate for discharge. Pulmonary Status:   O2 Device: Nasal cannula (01/22/23 1854)   Adequate oxygenation and airway patent. Complications related to anesthesia: None    Post-anesthesia assessment completed. No concerns. Patient has met all discharge requirements.     Signed By: Jem Bhatia MD    January 22, 2023

## 2023-01-23 NOTE — PROGRESS NOTES
Report including SBAR received from [facility].    0800-Patient sitting up in bed, receiving blood at this time. He is alert and oriented. Patient attempting to eat breakfast, wife reports vomiting to this nurse, small amount of green emesis noted to patients gown and blanket, patient cleaned up and linen changed at this time and given Zofran. Will continue to monitor.

## 2023-01-23 NOTE — ROUTINE PROCESS
Discussed critical lab result of 4.8 and 14.6 hgb and hct. Received order to transfuse 1 unit of PRBCs.

## 2023-01-23 NOTE — PROGRESS NOTES
Problem: Mobility Impaired (Adult and Pediatric)  Goal: *Acute Goals and Plan of Care (Insert Text)  Description: In 1-7 days pt will be able to perform:  ST.  Bed mobility:  Rolling L to R to L modified independent for positioning. 2.  Supine to sit to supine CGA with HR for meals. 3.  Sit to stand to sit CGA with RW in prep for ambulation. LT.  Gait:  Ambulate >30ft CGA/min A with RW, WBAT, for home/community mobility. 2.  Activity tolerance: Tolerate up in chair 1-2 hours for ADL's.  3.  Patient/Family Education:  Patient/family to be independent with HEP for follow-up care and safe discharge. Note: []  Patient has met MD mobilization crieliazar for d/c home   []  Recommend HH with 24 hour adult care   [x]  Benefit from additional acute PT session to address:  transfer training, gait training    PHYSICAL THERAPY EVALUATION    Patient: Aydee Valente (41 y.o. male)  Date: 2023  Primary Diagnosis: Intertrochanteric fracture of left femur ([address] [S72.142A]  Procedure(s) (LRB):  FEMUR INSERTION INTRA MEDULLARY NAIL, LEFT WITH C-ARM (Left) 1 Day Post-Op   Precautions:   Fall, WBAT (Blind/nonverbal)    PLOF: Independent w/ cane; lives w/ wife; blind and on verbal    ASSESSMENT :  Based on the objective data described below, the patient presents with decreased mobility in regards to bed mobility, transfers, gt quality and tolerance, balance, stair negotiation and safety due to L femur IM nail surgery. Decreased AROM of L LE, dec strength of L LE, pain in L LE, dec sensation of L LE/feet (toe amputations B feet) also impacting pt functional mobility. Pt rating pain on numerical pain scale pre/post and during session 7/10. Pt ed regarding mobility safety, WB, HEP, ice application/use, elevation, environmental safety and need to use call bell for activity.   Pt supine in bed upon arrival.  Pt able to perform supine<>sit w/ min A additional time and sit<>stand w/ min Ax2, bed slightly elevated, additional time. Safety vc required throughout session to reinforce safety. Pt able to participate in gt training using RW, GB, WBAT and CGA w/ antalgic gt pattern. Pt uses white dry erase board to communicate. Answered questions by pt and caregiver in regards to PT and mobility. Pt left supine in bed w/ all needs within reach, and ice pack to L hip. Nurse Pat aware of session and outcomes. Recommend SNF rehab vs HHPT with responsible adult care at least 24 hours upon hospital d/c, dependent on family support/abilities as pt may be more familiar w/ home set up vs rehab. Patient will benefit from skilled intervention to address the above impairments. Patient's rehabilitation potential is considered to be Good  Factors which may influence rehabilitation potential include:   []         None noted  []         Mental ability/status  []         Medical condition  []         Home/family situation and support systems  []         Safety awareness  [x]         Pain tolerance/management  []         Other:      PLAN :  Recommendations and Planned Interventions:   [x]           Bed Mobility Training             []    Neuromuscular Re-Education  [x]           Transfer Training                   []    Orthotic/Prosthetic Training  [x]           Gait Training                          [x]    Modalities  [x]           Therapeutic Exercises           [x]    Edema Management/Control  [x]           Therapeutic Activities            [x]    Family Training/Education  [x]           Patient Education  []           Other (comment):    Frequency/Duration: Patient will be followed by physical therapy 1-2 times per day/4-7 days per week to address goals. Discharge Recommendations: Home Health  Further Equipment Recommendations for Discharge: N/A    AMPA: 12/20    This AMPAC score should be considered in conjunction with interdisciplinary team recommendations to determine the most appropriate discharge setting.  Patient's social support, diagnosis, medical stability, and prior level of function should also be taken into consideration. SUBJECTIVE:   Patient wrote \"Try on dry erase board. OBJECTIVE DATA SUMMARY:     Past Medical History:   Diagnosis Date    Diabetes ([facility]     Hypertension     not on meds    Lung cancer ([facility]     PAD (peripheral artery disease) ([facility]     Th[facility]      Past Surgical History:   Procedure Laterality Date    HX ORTHOPAEDIC Right 2010    amputation x3 toes     HX ORTHOPAEDIC Left 2013    amputation x2 toes     [place] UNLISTED PROCEDURE VASCULAR SURGERY Right     arterial stent     [place] UNLIST[place] PROCEDURE VASCULAR SURGERY Left     \"replaced an artery\"     Barriers to Learning/Limitations: yes;  sensory deficits-vision/hearing/speech and physical  Compensate with: Verbal Cues, Tactile Cues, and Kinesthetic Cues  Home Situation:  Home Situation  Home Environment: Apartment  # Steps to Enter: 0  One/Two Story Residence: One story  Living Alone: No  Support Systems: Spouse/Significant Other  Patient Expects to be Discharged to[de-identified] Home with family assistance  Current DME Used/Available at Home: Cane, quad, Walker, rolling  Tub or Shower Type: Tub/Shower combination  Critical Behavior:  Neurologic State: Alert  Orientation Level: Unable to verbalize  Cognition: Follows commands  Safety/Judgement: Insight into deficits  Psychosocial  Patient Behaviors: Calm; Cooperative  Needs Expressed:  (uses white board for communication)  Strength:    Strength: Generally decreased, functional  Tone & Sensation:   Tone: Normal  Sensation: Intact  Range Of Motion:  AROM: Generally decreased, functional  PROM: Generally decreased, functional  Posture:  Functional Mobility:  Bed Mobility:  Supine to Sit: Minimum assistance; Additional time (vc)  Sit to Supine: Minimum assistance; Additional time (vc)  Scooting: Minimum assistance; Additional time (vc)  Transfers:  Sit to Stand: Minimum assistance;Assist x2; Additional time (vc; bed slightly elevated)  Stand to Sit: Minimum assistance;Assist x2  Balance:   Sitting: Intact  Standing: With support; Impaired  Standing - Static: Fair;Constant support  Standing - Dynamic : Poor  Wheelchair Mobility:  Ambulation/Gait Training:  Distance (ft): 1 Feet (ft)  Assistive Device: Walker, rolling;Gait belt  Ambulation - Level of Assistance: Minimal assistance;Assist x2; Additional time (vc)  Gait Abnormalities: Antalgic;Decreased step clearance; Step to gait  Left Side Weight Bearing: As tolerated  Base of Support: Shift to right  Stance: Left decreased  Speed/Payal: Slow;Shuffled  Step Length: Left shortened;Right shortened  Swing Pattern: Left asymmetrical;Right asymmetrical  Interventions: Safety awareness training; Tactile cues; Verbal cues; Visual/Demos  Stairs: Therapeutic Exercises:   Encouraged HEP  Pain:  Pain level pre-treatment: 7/10   Pain level post-treatment: 7/10   Pain Intervention(s) : Medication (see MAR); Rest, Ice, Repositioning  Response to intervention: Nurse notified, See doc flow    Activity Tolerance:   Fair   Please refer to the flowsheet for vital signs taken during this treatment. After treatment:   []         Patient left in no apparent distress sitting up in chair  [x]         Patient left in no apparent distress in bed  [x]         Call bell left within reach  [x]         Nursing notified  []         Caregiver present  []         Bed alarm activated  [x]         SCDs applied    COMMUNICATION/EDUCATION:   [x]         Role of Physical Therapy in the acute care setting. [x]         Fall prevention education was provided and the patient/caregiver indicated understanding. [x]         Patient/family have participated as able in goal setting and plan of care. [x]         Patient/family agree to work toward stated goals and plan of care. []         Patient understands intent and goals of therapy, but is neutral about his/her participation.   []         Patient is unable to participate in goal setting/plan of care: ongoing with therapy staff.  []         Other: Thank you for this referral.  Corby Leyva, PT   Time Calculation: 32 mins      Eval Complexity: History: HIGH Complexity :3+ comorbidities / personal factors will impact the outcome/ POC Exam:MEDIUM Complexity : 3 Standardized tests and measures addressing body structure, function, activity limitation and / or participation in recreation  Presentation: LOW Complexity : Stable, uncomplicated  Clinical Decision Making:Medium Complexity    Overall Complexity:LOW     Shyla Legato AM-PAC® Basic Mobility Inpatient Short Form (6-Clicks) Version 2    How much HELP from another person does the patient currently need    (If the patient hasn't done an activity recently, how much help from another person do you think he/she would need if he/she tried?)   Total (Total A or Dep)   A Lot  (Mod to Max A)   A Little (Sup or Min A)   None (Mod I to I)   Turning from your back to your side while in a flat bed without using bedrails? [] 1 [] 2 [x] 3 [] 4   2. Moving from lying on your back to sitting on the side of a flat bed without using bedrails? [] 1 [] 2 [x] 3 [] 4   3. Moving to and from a bed to a chair (including a wheelchair)? [] 1 [x] 2 [] 3 [] 4   4. Standing up from a chair using your arms (e.g., wheelchair, or bedside chair)? [] 1 [x] 2 [] 3 [] 4   5. Walking in hospital room? [] 1 [x] 2 [] 3 [] 4   6. Climbing 3-5 steps with a railing?+   [] 1 [] 2 [] 3 [] 4   +If stair climbing cannot be assessed, skip item #6. Sum responses from items 1-5. Based on an AM-PAC score of 12/20 and their current functional mobility deficits, it is recommended that the patient have 3-5 sessions per week of Physical Therapy at d/c to increase the patient's independence.

## 2023-01-23 NOTE — PROGRESS NOTES
Spoke to  about patients very low hemoglobin,patient receiving 1 unit of PRBCs. Order placed to monitor patient on tele at this time. Remote tele applied, patient Sinus Rhythm/tach. Vitals stable at this time, will continue to monitor.

## 2023-01-23 NOTE — PROGRESS NOTES
Hospitalist Progress Note    Patient: Cheyenne Mckeon MRN: [id number]  CSN: [id number]    YOB: 1955  Age: 79 y.o. Sex: male    DOA: 1/21/2023 LOS:  LOS: 2 days                Assessment/Plan     Patient Active Problem List   Diagnosis Code    Intertrochanteric fracture of left femur ([facility] S72.142A    Diabetes ([facility] E11.9    Hypertension I10    Throat cancer ([facility] C14.0    Lung cancer ([facility] C34.90    Tracheostomy in place ([facility] [facility] Z93.0    PAD (peripheral artery disease) ([facility] I73.9    Legally blind H54.8    Thrombocytopenia ([facility] D69.6    Anemia D64.9        Chief complaint :  Fall  79 y.o. male with DM, HTN, throat ca, s/p tracheostomy, lung ca under chemo, PAD S/p L fem-pop bypass, R EIA/ SFA stents, legally blind presents to ER with concerns of fall. Left intertrochanteric femur fracture -  S/p left hip intramedullary nail fixation. 01/22/2023. Acute blood loss anemia -  Blood transfusion ordered  Transfuse to keep Hb > 7     DM -  Started on SSI. HTN -  Continue home meds  Monitor BP     History of throat ca -  Tracheostomy in place  Pureed diet. Lung ca -  Under chemotherapy  Followed by Dr. Fredis Bales     PAD -  S/p L fem-pop bypass, R EIA/ SFA stents. Hold aspirin    Thrombocytopenia -  Secondary to chemotherapy  Received platelets before surgery     Legally blind     Post op DVT prophylaxis, PT/OT per ortho. Disposition : 2-3 days. Review of systems  General: No fevers or chills. Cardiovascular: No chest pain or pressure. No palpitations. Pulmonary: No shortness of breath. Gastrointestinal: No nausea, vomiting. Physical Exam:  General: Awake, cooperative, no acute distress    HEENT: NC, Atraumatic. Trach in place  Lungs: CTA Bilaterally. No Wheezing/Rhonchi/Rales. Heart:  S1 S2,  No murmur, No Rubs, No Gallops  Abdomen: Soft, Non distended, Non tender. +Bowel sounds,   Extremities: No c/c/e  Psych:   Not anxious or agitated.   Neurologic:  No acute neurological deficit. Vital signs/Intake and Output:  Visit Vitals  BP (!) 121/58   Pulse 96   Temp 98.6 °F (37 °C)   Resp 16   Ht 6' 4\" (1.93 m)   Wt 79.7 kg (175 lb 11.3 oz)   SpO2 100%   BMI 21.39 kg/m²     Current Shift:  01/23 0701 - 01/23 1900  In: 378.3   Out: -   Last three shifts:  01/21 1901 - 01/23 0700  In: 6793 [I.V.:2300]  Out: 380 [Urine:80]            Labs: Results:       Chemistry Recent Labs     01/23/23  0350 01/21/23  1611   * 277*    141   K 4.2 3.8   * 115*   CO2 20* 17*   BUN 25* 14   CREA 1.29 1.11   CA 7.8* 8.2*   AGAP 7 9   BUCR 19 13      CBC w/Diff Recent Labs     01/23/23  1730 01/23/23  0350 01/21/23  1611   WBC  --  4.8 4.9   RBC  --  1.66* 2.97*   HGB 5.7* 4.8* 8.6*   HCT 17.4* 14.6* 25.6*   PLT  --  62* 79*   GRANS  --   --  81*   LYMPH  --   --  7*   EOS  --   --  0      Cardiac Enzymes No results for input(s): CPK, CKND1, FRANCES in the last 72 hours. No lab exists for component: CKRMB, TROIP   Coagulation Recent Labs     01/22/23  1036   PTP 14.8   INR 1.1   APTT 29.1       Lipid Panel No results found for: CHOL, CHOLPOCT, CHOLX, CHLST, CHOLV, 332739, HDL, HDLP, LDL, LDLC, DLDLP, 111030, VLDLC, VLDL, TGLX, TRIGL, TRIGP, TGLPOCT, CHHD, CHHDX   BNP No results for input(s): BNPP in the last 72 hours. Liver Enzymes No results for input(s): TP, ALB, TBIL, AP in the last 72 hours.     No lab exists for component: SGOT, GPT, DBIL   Thyroid Studies No results found for: T4, T3U, TSH, TSHEXT, TSHEXT     Procedures/imaging: see electronic medical records for all procedures/Xrays and details which were not copied into this note but were reviewed prior to creation of Plan

## 2023-01-23 NOTE — PROGRESS NOTES
Problem: Self Care Deficits Care Plan (Adult)  Goal: *Acute Goals and Plan of Care (Insert Text)  Description: Initial Occupational Therapy Goals (1/23/2023) Within 7 day(s):    1. Patient will perform grooming standing sinkside with CGA for increased independence with ADLs. 2. Patient will perform LB dressing with min A & A/E PRN for increased independence with ADLs. 3. Patient will perform toilet transfer with CGA for increased independence with ADLs. 4. Patient will perform all aspects of toileting with CGA for increased independence with ADLs. 5. Patient will independently apply energy conservation techniques with 1 verbal cue(s)for increased independence with ADLs. 6. Patient will perform bathroom mobility with CGA for increased independence/safety with ADLs. Outcome: Progressing Towards Goal  OCCUPATIONAL THERAPY EVALUATION    Patient: Lissa Duenas (5 y.o. male)  Date: 1/23/2023  Primary Diagnosis: Intertrochanteric fracture of left femur (HCC) [S72.142A]  Procedure(s) (LRB):  FEMUR INSERTION INTRA MEDULLARY NAIL, LEFT WITH C-ARM (Left) 1 Day Post-Op   Precautions: Fall, WBAT  PLOF: pt ambulates with a cane at baseline and lives with spouse. Pt is non-verbal and has tracheostomy, and is legally blind. Pt reports spouse would assist minimally with ADLs as needed    ASSESSMENT AND RECOMMENDATIONS:  Based on the objective data described below, the patient presents with LLE decreased ROM and strength affecting LE ADLs. Pt found supine in bed, reporting pain 7/10, agreeable to therapy. Pt in non verbal and is able to communicate with white board. Pt able to sit up to EOB with min A, pt requires additional time for mobility and constant vc d/t impaired vision. Pt able to perform STS with min A x2 and height of bed elevated. Pt attempted to take side step to L however demonstrates difficulty bearing weight through LLE, minor knee buckling noted.  Pt requested to sit back down for a rest break, pt sat with min Ax2. After short rest break pt requesting to try standing again, pt performed STS transfer with min Ax2. Pt required min A to return to supine, [place] function utilized to scoot pt up in bed. Pt left supine in bed, call bell within reach, ice applied to L hip, RN present. At this time recommend rehab placement at hospital d/c (vs. Home health with 24/7 family assist pending progress as pt would be in a more familiar environment). Education: Reviewed home safety, body mechanics, importance of moving every hour to prevent joint stiffness, role of ice for edema/pain control, Rolling Walker management/safety, and adaptive dressing techniques with patient verbalizing  understanding at this time     Patient will benefit from skilled intervention to address the above impairments. Patient's rehabilitation potential is considered to be Good  Factors which may influence rehabilitation potential include:   []             None noted  []             Mental ability/status  [x]             Medical condition  [x]             Home/family situation and support systems  []             Safety awareness  []             Pain tolerance/management  []             Other:        PLAN :  Recommendations and Planned Interventions:   [x]               Self Care Training                  [x]      Therapeutic Activities  [x]               Functional Mobility Training   []      Cognitive Retraining  [x]               Therapeutic Exercises           [x]      Endurance Activities  [x]               Balance Training                    []      Neuromuscular Re-Education  [x]               Visual/Perceptual Training     [x]      Home Safety Training  [x]               Patient Education                   [x]      Family Training/Education  []               Other (comment):    Frequency/Duration: Patient will be followed by Occupational Therapy 1-2 times per day/4-7 days per week to address goals.   Discharge Recommendations: Rehab (vs. Home health with 24/7 family assist pending progress)  Further Equipment Recommendations for Discharge: N/A    AMPAC: Based on an AM-PAC score of 15/24 and their current ADL deficits; it is recommended that the patient have 3-5 sessions per week of Occupational Therapy at d/c to increase the patient's independence. This AMPAC score should be considered in conjunction with interdisciplinary team recommendations to determine the most appropriate discharge setting. Patient's social support, diagnosis, medical stability, and prior level of function should also be taken into consideration. SUBJECTIVE:   Patient wrote on whiteboard where's my wife? Bryon Bonner    OBJECTIVE DATA SUMMARY:     Past Medical History:   Diagnosis Date    Diabetes ([facility]     Hypertension     not on meds    Lung cancer ([facility] [address]     PAD (peripheral artery disease) ([facility] [address]     Throat cancer ([facility] [address]      Past Surgical History:   Procedure Laterality Date    HX ORTHOPAEDIC Right 2010    amputation x3 toes     HX ORTHOPAEDIC Left 2013    amputation x2 toes     MN UNLISTED PROCEDURE VASCULAR SURGERY Right     arterial stent     MN UNLISTED PROCEDURE VASCULAR SURGERY Left     \"replaced an artery\"     Barriers to Learning/Limitations: yes;  sensory deficits-vision/hearing/speech and physical  Compensate with: visual, verbal, tactile, kinesthetic cues/model    Home Situation/Prior Level of Function:   Home Situation  Home Environment: Apartment  # Steps to Enter: 0  One/Two Story Residence: One story  Living Alone: No  Support Systems: Spouse/Significant Other  Patient Expects to be Discharged to[de-identified] Home with family assistance  Current DME Used/Available at Home: Cane, quad  Tub or Shower Type: Tub/Shower combination  []  Right hand dominant   []  Left hand dominant    Cognitive/Behavioral Status:  Neurologic State: Alert     Cognition: Follows commands; Appropriate for age attention/concentration  Safety/Judgement: Insight into deficits    Skin: L hip incision w/ Mepilex   Edema: applied ice     Coordination: BUE  Coordination: Within functional limits  Fine Motor Skills-Upper: Left Intact; Right Intact    Gross Motor Skills-Upper: Left Intact; Right Intact    Balance:  Sitting: Intact  Standing: Impaired; With support    Strength: BUE  Strength: Generally decreased, functional    Tone & Sensation:BUE  Tone: Normal  Sensation: Intact    Range of Motion: BUE  AROM: Generally decreased, functional  PROM: Generally decreased, functional    Functional Mobility and Transfers for ADLs:  Bed Mobility:  Supine to Sit: Minimum assistance; Additional time (vc)  Sit to Supine: Minimum assistance; Additional time (vc)  Scooting: Minimum assistance (vc)  Transfers:  Sit to Stand: Minimum assistance;Assist x2 (height of bed elevated)    ADL Assessment:  Feeding: Setup;Supervision  Oral Facial Hygiene/Grooming: Minimum assistance  Bathing: Maximum assistance  Upper Body Dressing: Minimum assistance  Lower Body Dressing: Maximum assistance  Toileting: Moderate assistance    ADL Intervention  Grooming  Grooming Assistance: Set-up; Contact guard assistance  Position Performed: Seated edge of bed  Washing Face: Contact guard assistance    Cognitive Retraining  Safety/Judgement: Insight into deficits    Pain:  Pain level pre-treatment: 7/10  Pain level post-treatment: 7/10  Pain Intervention(s): Rest, Ice, Repositioning   Response to intervention: Nurse notified, see doc flow     Activity Tolerance:   Fair. Patient able to stand ~1 minute(s). Patient limited by pain, strength, ROM, balance, vision. Patient unsteady. Please refer to the flowsheet for vital signs taken during this treatment.   After treatment:   []  Patient left in no apparent distress sitting up in chair  []  Patient sitting on EOB  [x]  Patient left in no apparent distress in bed  [x]  Call bell left within reach  [x]  Nursing notified  []  Caregiver present  [x]  Ice applied  []  SCD's on while back in bed  [] Bed alarm activated    COMMUNICATION/EDUCATION:   Communication/Collaboration:  [x]       Role of Occupational Therapy in the acute care setting. [x]      Home safety education was provided and the patient/caregiver indicated understanding. [x]      Patient/family have participated as able in goal setting and plan of care. [x]      Patient/family agree to work toward stated goals and plan of care. []      Patient understands intent and goals of therapy, but is neutral about his/her participation. []      Patient is unable to participate in plan of care at this time. Thank you for this referral.  Anibal Lubin OTR/L  Time Calculation: 23 mins    Eval Complexity: History: HIGH Complexity : Extensive review of history including physical, cognitive and psychosocial history ; Examination: HIGH Complexity : 5 or more performance deficits relating to physical, cognitive , or psychosocial skils that result in activity limitations and / or participation restrictions; Decision Making:HIGH Complexity : Patient presents with comorbidities that affect occupational performance. Signifigant modification of tasks or assistance (eg, physical or verbal) with assessment (s) is necessary to enable patient to complete evaluation     [facility]® Daily Activity Inpatient Short Form (6-Clicks)    How much HELP from another person does the patient currently need    (If the patient hasn't done an activity recently, how much help from another person do you think he/she would need if he/she tried?)   Total (Total A or Dep)   A Lot  (Mod to Max A)   A Little (Sup or Min A)   None (Mod I to I)   Putting on and taking off regular lower body clothing? [] 1 [x] 2 [] 3 [] 4   2. Bathing (including washing, rinsing,      drying)? [] 1 [x] 2 [] 3 [] 4   3. Toileting, which includes using toilet, bedpan or urinal?   [] 1 [x] 2 [] 3 [] 4   4. Putting on and taking off regular upper body clothing? [] 1 [] 2 [x] 3 [] 4   5.  Taking care of personal grooming such as brushing teeth? [] 1 [] 2 [x] 3 [] 4   6. Eating meals? [] 1 [] 2 [x] 3 [] 4   Based on an AM-PAC score of 15/24 and their current ADL deficits; it is recommended that the patient have 3-5 sessions per week of Occupational Therapy at d/c to increase the patient's independence.

## 2023-01-23 NOTE — PROGRESS NOTES
Problem: Pressure Injury - Risk of  Goal: *Prevention of pressure injury  Description: Document Uday Scale and appropriate interventions in the flowsheet. Outcome: Progressing Towards Goal  Note: Pressure Injury Interventions:  Sensory Interventions: Assess changes in LOC, Avoid rigorous massage over bony prominences, Check visual cues for pain, Minimize linen layers    Moisture Interventions: Absorbent underpads, Internal/External urinary devices, Minimize layers    Activity Interventions: Increase time out of bed, Pressure redistribution bed/mattress(bed type), PT/OT evaluation    Mobility Interventions: Pressure redistribution bed/mattress (bed type), PT/OT evaluation    Nutrition Interventions: Document food/fluid/supplement intake, Discuss nutritional consult with provider, Offer support with meals,snacks and hydration    Friction and Shear Interventions: Minimize layers                Problem: Falls - Risk of  Goal: *Absence of Falls  Description: Document Shaheen Fall Risk and appropriate interventions in the flowsheet.   Outcome: Progressing Towards Goal  Note: Fall Risk Interventions:  Mobility Interventions: Bed/chair exit alarm, Patient to call before getting OOB, Utilize walker, cane, or other assistive device         Medication Interventions: Assess postural VS orthostatic hypotension, Bed/chair exit alarm, Patient to call before getting OOB, Teach patient to arise slowly    Elimination Interventions: Bed/chair exit alarm, Call light in reach, Patient to call for help with toileting needs, Toileting schedule/hourly rounds    History of Falls Interventions: Bed/chair exit alarm, Door open when patient unattended, Room close to nurse's station         Problem: Pain  Goal: *Control of Pain  Outcome: Progressing Towards Goal     Problem: Nausea/Vomiting (Adult)  Goal: *Absence of nausea/vomiting  Outcome: Progressing Towards Goal     Problem: Dysphagia (Adult)  Goal: *Speech Goal: (INSERT TEXT)  Outcome: Progressing Towards Goal     Problem: Fluid Volume - Risk of, Imbalanced  Goal: *Balanced intake and output  Outcome: Progressing Towards Goal

## 2023-01-24 ENCOUNTER — APPOINTMENT (OUTPATIENT)
Dept: GENERAL RADIOLOGY | Age: 68
DRG: 481 | End: 2023-01-24
Attending: HOSPITALIST
Payer: MEDICARE

## 2023-01-24 LAB
ANION GAP SERPL CALC-SCNC: 9 MMOL/L (ref 3–18)
BUN SERPL-MCNC: 14 MG/DL (ref 7–18)
BUN/CREAT SERPL: 17 (ref 12–20)
CALCIUM SERPL-MCNC: 7.9 MG/DL (ref 8.5–10.1)
CHLORIDE SERPL-SCNC: 111 MMOL/L (ref 100–111)
CO2 SERPL-SCNC: 20 MMOL/L (ref 21–32)
CREAT SERPL-MCNC: 0.81 MG/DL (ref 0.6–1.3)
ERYTHROCYTE [DISTWIDTH] IN BLOOD BY AUTOMATED COUNT: 16.7 % (ref 11.6–14.5)
GLUCOSE BLD STRIP.AUTO-MCNC: 107 MG/DL (ref 70–110)
GLUCOSE BLD STRIP.AUTO-MCNC: 108 MG/DL (ref 70–110)
GLUCOSE BLD STRIP.AUTO-MCNC: 113 MG/DL (ref 70–110)
GLUCOSE BLD STRIP.AUTO-MCNC: 114 MG/DL (ref 70–110)
GLUCOSE SERPL-MCNC: 111 MG/DL (ref 74–99)
HCT VFR BLD AUTO: 19.3 % (ref 36–48)
HCT VFR BLD AUTO: 23 % (ref 36–48)
HCT VFR BLD AUTO: 25 % (ref 36–48)
HGB BLD-MCNC: 6.5 G/DL (ref 13–16)
HGB BLD-MCNC: 7.6 G/DL (ref 13–16)
HGB BLD-MCNC: 8.4 G/DL (ref 13–16)
MCH RBC QN AUTO: 28.4 PG (ref 24–34)
MCHC RBC AUTO-ENTMCNC: 33 G/DL (ref 31–37)
MCV RBC AUTO: 85.8 FL (ref 78–100)
NRBC # BLD: 0.02 K/UL (ref 0–0.01)
NRBC BLD-RTO: 0.5 PER 100 WBC
PLATELET # BLD AUTO: 51 K/UL (ref 135–420)
PMV BLD AUTO: 10.2 FL (ref 9.2–11.8)
POTASSIUM SERPL-SCNC: 3.8 MMOL/L (ref 3.5–5.5)
RBC # BLD AUTO: 2.68 M/UL (ref 4.35–5.65)
SODIUM SERPL-SCNC: 140 MMOL/L (ref 136–145)
WBC # BLD AUTO: 4.3 K/UL (ref 4.6–13.2)

## 2023-01-24 PROCEDURE — 85018 HEMOGLOBIN: CPT

## 2023-01-24 PROCEDURE — 80048 BASIC METABOLIC PNL TOTAL CA: CPT

## 2023-01-24 PROCEDURE — 74011000250 HC RX REV CODE- 250: Performed by: ORTHOPAEDIC SURGERY

## 2023-01-24 PROCEDURE — 65270000032 HC RM SEMIPRIVATE

## 2023-01-24 PROCEDURE — 74011250637 HC RX REV CODE- 250/637: Performed by: ORTHOPAEDIC SURGERY

## 2023-01-24 PROCEDURE — 36430 TRANSFUSION BLD/BLD COMPNT: CPT

## 2023-01-24 PROCEDURE — 36415 COLL VENOUS BLD VENIPUNCTURE: CPT

## 2023-01-24 PROCEDURE — P9016 RBC LEUKOCYTES REDUCED: HCPCS

## 2023-01-24 PROCEDURE — 85027 COMPLETE CBC AUTOMATED: CPT

## 2023-01-24 PROCEDURE — 97530 THERAPEUTIC ACTIVITIES: CPT

## 2023-01-24 PROCEDURE — 82962 GLUCOSE BLOOD TEST: CPT

## 2023-01-24 PROCEDURE — 92526 ORAL FUNCTION THERAPY: CPT

## 2023-01-24 RX ORDER — SODIUM CHLORIDE 9 MG/ML
250 INJECTION, SOLUTION INTRAVENOUS AS NEEDED
Status: DISCONTINUED | OUTPATIENT
Start: 2023-01-24 | End: 2023-01-25 | Stop reason: HOSPADM

## 2023-01-24 RX ORDER — LEVOTHYROXINE SODIUM 25 UG/1
25 TABLET ORAL DAILY
Qty: 30 TABLET | Refills: 0 | Status: SHIPPED
Start: 2023-01-25

## 2023-01-24 RX ADMIN — DOCUSATE SODIUM 50 MG AND SENNOSIDES 8.6 MG 1 TABLET: 8.6; 5 TABLET, FILM COATED ORAL at 21:23

## 2023-01-24 RX ADMIN — ASPIRIN 81 MG: 81 TABLET, COATED ORAL at 21:23

## 2023-01-24 RX ADMIN — ACETAMINOPHEN 1000 MG: 500 TABLET ORAL at 21:23

## 2023-01-24 RX ADMIN — DOCUSATE SODIUM 50 MG AND SENNOSIDES 8.6 MG 1 TABLET: 8.6; 5 TABLET, FILM COATED ORAL at 08:48

## 2023-01-24 RX ADMIN — PRAVASTATIN SODIUM 40 MG: 20 TABLET ORAL at 21:23

## 2023-01-24 RX ADMIN — ACETAMINOPHEN 1000 MG: 500 TABLET ORAL at 15:52

## 2023-01-24 RX ADMIN — LEVOTHYROXINE SODIUM 25 MCG: 0.03 TABLET ORAL at 08:48

## 2023-01-24 RX ADMIN — SODIUM CHLORIDE, PRESERVATIVE FREE 10 ML: 5 INJECTION INTRAVENOUS at 14:00

## 2023-01-24 RX ADMIN — FERROUS SULFATE TAB 325 MG (65 MG ELEMENTAL FE) 325 MG: 325 (65 FE) TAB at 08:48

## 2023-01-24 RX ADMIN — ACETAZOLAMIDE 250 MG: 250 TABLET ORAL at 15:52

## 2023-01-24 RX ADMIN — SODIUM CHLORIDE, PRESERVATIVE FREE 10 ML: 5 INJECTION INTRAVENOUS at 05:59

## 2023-01-24 RX ADMIN — ACETAZOLAMIDE 250 MG: 250 TABLET ORAL at 08:48

## 2023-01-24 RX ADMIN — ACETAZOLAMIDE 250 MG: 250 TABLET ORAL at 21:23

## 2023-01-24 RX ADMIN — SODIUM CHLORIDE, PRESERVATIVE FREE 10 ML: 5 INJECTION INTRAVENOUS at 21:24

## 2023-01-24 NOTE — PROGRESS NOTES
Problem: Pressure Injury - Risk of  Goal: *Prevention of pressure injury  Description: Document Uday Scale and appropriate interventions in the flowsheet. Outcome: Progressing Towards Goal  Note: Pressure Injury Interventions:  Sensory Interventions: Minimize linen layers, Assess changes in LOC, Monitor skin under medical devices, Pressure redistribution bed/mattress (bed type)    Moisture Interventions: Absorbent underpads, Minimize layers    Activity Interventions: Pressure redistribution bed/mattress(bed type), PT/OT evaluation    Mobility Interventions: Pressure redistribution bed/mattress (bed type), PT/OT evaluation    Nutrition Interventions: Document food/fluid/supplement intake    Friction and Shear Interventions: Minimize layers                Problem: Patient Education: Go to Patient Education Activity  Goal: Patient/Family Education  Outcome: Progressing Towards Goal     Problem: Falls - Risk of  Goal: *Absence of Falls  Description: Document Shaheen Fall Risk and appropriate interventions in the flowsheet.   Outcome: Progressing Towards Goal  Note: Fall Risk Interventions:  Mobility Interventions: PT Consult for mobility concerns, PT Consult for assist device competence, Strengthening exercises (ROM-active/passive), Utilize walker, cane, or other assistive device         Medication Interventions: Teach patient to arise slowly, Patient to call before getting OOB    Elimination Interventions: Call light in reach, Bed/chair exit alarm    History of Falls Interventions: Bed/chair exit alarm, Room close to nurse's station, Assess for delayed presentation/identification of injury for 48 hrs (comment for end date), Utilize gait belt for transfer/ambulation         Problem: Patient Education: Go to Patient Education Activity  Goal: Patient/Family Education  Outcome: Progressing Towards Goal     Problem: Risk for Spread of Infection  Goal: Prevent transmission of infectious organism to others  Description: Prevent the transmission of infectious organisms to other patients, staff members, and visitors.   Outcome: Progressing Towards Goal     Problem: Patient Education:  Go to Education Activity  Goal: Patient/Family Education  Outcome: Progressing Towards Goal     Problem: Pain  Goal: *Control of Pain  Outcome: Progressing Towards Goal     Problem: Nausea/Vomiting (Adult)  Goal: *Absence of nausea/vomiting  Outcome: Progressing Towards Goal     Problem: Fluid Volume - Risk of, Imbalanced  Goal: *Balanced intake and output  Outcome: Progressing Towards Goal     Problem: Patient Education: Go to Patient Education Activity  Goal: Patient/Family Education  Outcome: Progressing Towards Goal     Problem: Patient Education: Go to Patient Education Activity  Goal: Patient/Family Education  Outcome: Progressing Towards Goal     Problem: Patient Education: Go to Patient Education Activity  Goal: Patient/Family Education  Outcome: Progressing Towards Goal

## 2023-01-24 NOTE — ROUTINE PROCESS
Bedside and Verbal shift change report given to National Shania RN (oncoming nurse) by David Griffin RN (offgoing nurse). Report included the following information SBAR, Kardex, Intake/Output, MAR, and Recent Results.

## 2023-01-24 NOTE — PROGRESS NOTES
D/C Plan: [facility] with physician follow up     CM and provider met with pt and his wife at bedside to discuss care transition. CM discussed [facility] vs SNF. Pt/family would like pt to return home with [facility] services. CM reviewed list provided yesterday and offered FOC. Pt/family have selected [facility] as a first choice and [id number] as a second choice. CMS has been notified to assist.  Provider has indicated pt is medically stable to discharge today. CM has requested a 4:00pm transport be arranged to assist with care transition. Pt/family are aware of plan discharge today. CM has confirmed pt's address on the face sheet. No other care transition needs have been identified at this time.   CM to continue to follow and assist.    Care Management Interventions  Mode of Transport at Discharge: [facility]  Transition of Care Consult (CM Consult): Discharge Planning, [address] Maintenance Reviewed: Yes  Physical Therapy Consult: Yes  Occupational Therapy Consult: Yes  Speech Therapy Consult: Yes  Support Systems: Child(rich), Spouse/Significant Other  Confirm Follow Up Transport: Family  The Plan for Transition of Care is Related to the Following Treatment Goals : [facility] with physician follow up  The Patient and/or Patient Representative was Provided with a Choice of Provider and Agrees with the Discharge Plan?: Yes  Name of the Patient Representative Who was Provided with a Choice of Provider and Agrees with the Discharge Plan: spouse  Freedom of Choice List was Provided with Basic Dialogue that Supports the Patient's Individualized Plan of Care/Goals, Treatment Preferences and Shares the Quality Data Associated with the Providers?: Yes  Discharge Location  Patient Expects to be Discharged to[de-identified] Home with home health

## 2023-01-24 NOTE — PROGRESS NOTES
Problem: Dysphagia (Adult)  Description: Patient will:  1. Tolerate PO trials with 0 s/s overt distress in 4/5 trials. 2. Utilize compensatory swallow strategies/maneuvers (decrease bite/sip, size/rate, alt. liq/sol) with min cues in 4/5 trials. 3. Perform oral-motor/laryngeal exercises to increase oropharyngeal swallow function with min cues. 4. Complete an objective swallow study (i.e., MBSS) to assess swallow integrity, r/o aspiration, and determine of safest LRD, min A.    Rec:     Easy to chew, thin liquid - alternate solids/liquids  Pt is a feeder  HOB >45 during po intake, remain >30 for 30-45 minutes after po   Small bites/sips; alternate liquid/solid with slow feeding rate   Oral care TID  Meds per pt preference   Outcome: Progressing Towards Goal    SPEECH LANGUAGE PATHOLOGY DYSPHAGIA TREATMENT    Patient: Yesi Sam (43 y.o. male)  Date: 1/24/2023  Diagnosis: Intertrochanteric fracture of left femur (HCC) [S72.142A] Intertrochanteric fracture of left femur (HCC)  Procedure(s) (LRB):  FEMUR INSERTION INTRA MEDULLARY NAIL, LEFT WITH C-ARM (Left) 2 Days Post-Op  Precautions: Fall, WBAT (Blind/nonverbal)  PLOF:as per H&P     ASSESSMENT:  Patient seen by ST to assess diet tolerance. Patient used exclusively gestures to communicate during today's session. Lunch tray brought to bedside. Patient requested that SLP put straw into chicken broth. Patient able to hold bowl and independently feed himself broth through the straw. Patient then requested water/ginger ale, which patient also consumed via straw. No difficulties with pharyngeal clearance reported today, as patient only consumed thin liquids. Oral care and suctioning completed after PO. Continue current diet/POC.      Progression toward goals:  [x]         Improving appropriately and progressing toward goals  []         Improving slowly and progressing toward goals  []         Not making progress toward goals and plan of care will be adjusted PLAN:  Recommendations and Planned Interventions:  See above  Patient continues to benefit from skilled intervention to address the above impairments. Continue treatment per established plan of care. Discharge Recommendations: To Be Determined     SUBJECTIVE:   Patient used gestures throughout session today. OBJECTIVE:   Cognitive and Communication Status:  Neurologic State: Alert  Orientation Level: Oriented X4  Cognition: Follows commands  Perception: Verbal, Tactile  Perseveration: No perseveration noted  Safety/Judgement: Awareness of environment  Dysphagia Treatment:  Oral Assessment:  Oral Assessment  Labial: No impairment  Dentition: Edentulous (normally wears dentures)  Oral Hygiene:  (fair)  Lingual: No impairment  Velum: Unable to visualize  Mandible: No impairment  Gag Reflex:  (did not test)  P.O. Trials:   Patient Position:  (HOB>45)   Vocal quality prior to P.O.: Aphonic   Consistency Presented: Thin liquid   How Presented: SLP-fed/presented, Self-fed/presented, Straw, Successive swallows       Bolus Acceptance: No impairment   Bolus Formation/Control: Impaired   Type of Impairment: Incomplete, Lip closure, Mastication   Propulsion: No impairment   Oral Residue: None   Initiation of Swallow: No impairment   Laryngeal Elevation: Other (comment) (s/p total laryngectomy)   Aspiration Signs/Symptoms: None   Pharyngeal Phase Characteristics: Other (comment) (no overt s/sx)   Effective Modifications:  Alternate liquids/solids, Small sips and bites   Cues for Modifications: Minimal         Oral Phase Severity: Mild   Pharyngeal Phase Severity :  (suspect)     PAIN:  Pain level pre-treatment: none reported  Pain level post-treatment: none reported    After treatment:   []              Patient left in no apparent distress sitting up in chair  [x]              Patient left in no apparent distress in bed  [x]              Call bell left within reach  [x]              Nursing notified  [] Family present  []              Caregiver present  []              Bed alarm activated      COMMUNICATION/EDUCATION:   [x] Aspiration precautions; swallow safety; compensatory techniques  []        Patient unable to participate in education; education ongoing with staff  []  Posted safety precautions in patient's room.   [] Oral-motor/laryngeal strengthening exercises    ELVIE Disla  Time Calculation: 20 mins

## 2023-01-24 NOTE — ACP (ADVANCE CARE PLANNING)
Advance Care Planning   Advance Care Planning Inpatient Note  [facility] Department    Today's Date: 1/24/2023  Unit: THE [address] SURGICAL/ONCOLOGY    Received request from Health Care provider. Upon review of chart and communication with care team, patient's decision making abilities are not in question. Patient and Spouse was/were present in the room during visit. Goals of ACP Conversation:  Discuss Advance Care planning documents    Health Care Decision Makers:      Primary Decision Maker: Suzette Lam - Spouse - [phone]    Secondary Decision Maker: Susana Alvarez - Daughter - [phone]    Summary:  Completed New Documents    Advance Care Planning Documents (Patient Wishes) on file:  Healthcare Power of /Advance Directive appointment of Health care agent  Living Will/ Advance Directive  Anatomical Gift/Organ donation     Assessment:    Patient does not talk but does understand what's is needed and uses a small board to write  Down his answers. That's how we did it. His wife wrote down everything with his permission.     Interventions:  Assisted in the completion of documents according to patient's wishes at this time    Care Preferences Communicated:  No    Outcomes/Plan:  New Advance Directive completed    Chaplain Toni on 1/24/2023 at 11:10 AM

## 2023-01-24 NOTE — DISCHARGE SUMMARY
Discharge Summary    Patient: Dalia Cordero MRN: [id number]  CSN: [id number]    YOB: 1955  Age: 79 y.o. Sex: male    DOA: 1/21/2023 LOS:  LOS: 3 days   Discharge Date:      Primary Care Provider:  Juanjose Lehman MD    Admission Diagnoses: Intertrochanteric fracture of left femur [facility]) Gaurang Carbajal    Discharge Diagnoses:    Problem List as of 1/24/2023 Date Reviewed: 1/22/2023            Codes Class Noted - Resolved    Anemia ICD-10-CM: D64.9  ICD-9-CM: 285.9  1/23/2023 - Present        Thrombocytopenia ([facility] ICD-10-CM: D69.6  ICD-9-CM: 287.5  1/22/2023 - Present        * (Principal) Intertrochanteric fracture of left femur ([facility] ICD-10-CM: G18.540B  ICD-9-CM: 820.21  1/21/2023 - Present        Diabetes ([facility] ICD-10-CM: E11.9  ICD-9-CM: 250.00  1/21/2023 - Present        Hypertension ICD-10-CM: I10  ICD-9-CM: 401.9  1/21/2023 - Present    Overview Signed 1/21/2023  9:41 PM by Darrian Velazquez MD     not on meds             Throat cancer ([facility] ICD-10-CM: C14.0  ICD-9-CM: 149.0  1/21/2023 - Present        Lung cancer ([facility] ICD-10-CM: C34.90  ICD-9-CM: 162.9  1/21/2023 - Present        Tracheostomy in place [facility]) ICD-10-CM: Z93.0  ICD-9-CM: V44.0  1/21/2023 - Present        PAD (peripheral artery disease) ([facility] ICD-10-CM: I73.9  ICD-9-CM: 443.9  1/21/2023 - Present        Legally blind ICD-10-CM: H54.8  ICD-9-CM: 369.4  1/21/2023 - Present           Discharge Medications:     Current Discharge Medication List        CONTINUE these medications which have CHANGED    Details   levothyroxine (SYNTHROID) 25 mcg tablet Take 1 Tablet by mouth daily. Qty: 30 Tablet, Refills: 0  Start date: 1/25/2023           CONTINUE these medications which have NOT CHANGED    Details   aspirin delayed-release 81 mg tablet Take 81 mg by mouth daily. pravastatin (PRAVACHOL) 40 mg tablet Take 40 mg by mouth nightly. acetaZOLAMIDE (DIAMOX) 250 mg tablet Take 250 mg by mouth three (3) times daily. polysorbate 80/glycerin (REFRESH DRY EYE THERAPY OP) Apply  to eye as needed. OTHER 1 % nightly as needed. Neomycin    Left eye      acetaminophen (TYLENOL) 325 mg tablet Take 325 mg by mouth every four (4) hours as needed for Pain. Discharge Condition: Good    Procedures : S/p left hip intramedullary nail fixation. 01/22/2023. Consults: Cardiology and Orthopedics      PHYSICAL EXAM   Visit Vitals  BP (!) 142/67   Pulse 99   Temp 98.8 °F (37.1 °C)   Resp 18   Ht 6' 4\" (1.93 m)   Wt 84.3 kg (185 lb 13.6 oz)   SpO2 100%   BMI 22.62 kg/m²     General: Awake, cooperative, no acute distress    HEENT: NC, Atraumatic. PERRLA, EOMI. Anicteric sclerae. Lungs:  CTA Bilaterally. No Wheezing/Rhonchi/Rales. Heart:  Regular  rhythm,  No murmur, No Rubs, No Gallops  Abdomen: Soft, Non distended, Non tender. +Bowel sounds,   Extremities: No c/c/e  Psych:   Not anxious or agitated. Neurologic:  No acute neurological deficits. Admission HPI :   Yesi Sam is a 79 y.o. male with DM, HTN, throat ca, s/p tracheostomy, lung ca under chemo, PAD S/p L fem-pop bypass, R EIA/ SFA stents, legally blind presents to ER with concerns of fall. Wife reports patient went to bathroom, while coming back he tripped over sneakers and fell. He complained of pain in left hip and unable to move his left hip. EMS called and he was brought to ER. He denies any loss of consciousness, chest pain, SOB, fever/chills. In ER x-ray showed left intertrochanteric cancer. Hospital Course :   Mr. Kilo Davalos was admitted to monitored floor, he was seen and followed by cardiology and orthopedics. Left intertrochanteric femur fracture -  S/p left hip intramedullary nail fixation. 01/22/2023. Acute blood loss anemia -  Blood transfusion ordered  H/H improved after blood transfusion     DM -  Started on SSI.      HTN -  Continued home meds  Monitored BP     History of throat ca -  Tracheostomy in place  Seen and followed by SLP. Lung ca -  Under chemotherapy  Followed by Dr. Fredis Bales     PAD -  S/p L fem-pop bypass, R EIA/ SFA stents. Resume aspirin     Thrombocytopenia -  Secondary to chemotherapy  Received platelets before surgery    He worked with PT/OT, he wants to go home with home health. Activity: Activity as tolerated    Diet: Diabetic Diet    Follow-up: PCP, orthopedics, Heme/onc    Disposition: home with home health    Minutes spent on discharge: 45       Labs: Results:       Chemistry Recent Labs     01/24/23  0550 01/23/23  0350   * 172*    142   K 3.8 4.2    115*   CO2 20* 20*   BUN 14 25*   CREA 0.81 1.29   CA 7.9* 7.8*   AGAP 9 7   BUCR 17 19      CBC w/Diff Recent Labs     01/24/23  1049 01/24/23  0550 01/24/23  0035 01/23/23  1730 01/23/23  0350   WBC  --  4.3*  --   --  4.8   RBC  --  2.68*  --   --  1.66*   HGB 8.4* 7.6* 6.5*   < > 4.8*   HCT 25.0* 23.0* 19.3*   < > 14.6*   PLT  --  51*  --   --  62*    < > = values in this interval not displayed. Cardiac Enzymes No results for input(s): CPK, CKND1, FRANCES in the last 72 hours. No lab exists for component: CKRMB, TROIP   Coagulation Recent Labs     01/22/23  1036   PTP 14.8   INR 1.1   APTT 29.1       Lipid Panel No results found for: CHOL, CHOLPOCT, CHOLX, CHLST, CHOLV, 589944, HDL, HDLP, LDL, LDLC, DLDLP, 359122, VLDLC, VLDL, TGLX, TRIGL, TRIGP, TGLPOCT, CHHD, CHHDX   BNP No results for input(s): BNPP in the last 72 hours. Liver Enzymes No results for input(s): TP, ALB, TBIL, AP in the last 72 hours. No lab exists for component: SGOT, GPT, DBIL   Thyroid Studies No results found for: T4, T3U, TSH, TSHEXT         Significant Diagnostic Studies: XR CHEST SNGL V    Result Date: 1/21/2023  INDICATION:  possible preop FINDINGS: Single AP portable view of the chest obtained at 1638 demonstrates a normal cardiomediastinal silhouette. The lungs are clear bilaterally. There is a right chest portacatheter.  No osseous abnormalities are seen. No evidence of acute cardiopulmonary process. XR HIP LT W OR WO PELV 2-3 VWS    Result Date: 1/21/2023  Clinical Data:  left hip pain s/p trip and fall from standing Comparison;  None. FINDINGS: AP view of the pelvis and frogleg lateral view of the left  hip demonstrate an acute intertrochanteric left proximal femoral fracture with avulsion of the greater trochanter and mild varus angulation. The bones are osteopenic. There is bilateral hip DJD. Pubic symphysis appears fused. Vascular stents are noted on the right. Acute intertrochanteric left proximal femoral fracture, associated avulsion of the greater trochanter, and varus angulation of the proximal femur. XR FEMUR LT 2 V    Result Date: 1/21/2023  INDICATION:  pain s/p trip and fall from standing COMPARISON: None FINDINGS: AP and lateral views of the left femur demonstrates an acute intertrochanteric proximal left femur fracture with varus angulation. There is avulsion of the greater trochanter. There is hip and knee DJD, severe in the knee. The bones are osteopenic. Vascular calcifications are noted. 1. Acute intertrochanteric proximal left femur fracture with varus angulation. Associated avulsion of the greater trochanter. 2. Severe knee DJD. XR HIP LT W OR WO PELV  1 VW    Result Date: 1/23/2023  CLINICAL HISTORY: pacu post op portable. INDICATION: pacu post op portable. COMPARISON: 1/21/2023 FINDINGS: AP, view of the left hip is obtained. Interval intramedullary gwendolyn and screw stabilization of intertrochanteric fracture. Typical postprocedural changes in the subcutaneous tissue. Peripheral vascular disease. .     No acute fracture or dislocation    ECHO ADULT COMPLETE    Result Date: 1/21/2023    Contrast used: Definity.    Echo study was technically difficult with poor endocardial visualization, technically difficult due to patient's body habitus, a technically difficult Doppler study and technically difficult due to low parasternal window. Left Ventricle: Low normal left ventricular systolic function with a visually estimated EF of 55%. Not well visualized. Left ventricle size is normal. Normal wall thickness. Normal wall motion. Diastolic dysfunction present with normal LV EF. Tricuspid Valve: Not well visualized, The estimated RVSP is 18 mmHg. Pericardium: There is evidence of epicardial fat. Trivial localized pericardial effusion present around the right ventricle. No indication of cardiac tamponade. No results found for this or any previous visit. Please note that this dictation was completed with [employer], the [employer] voice recognition software. Quite often unanticipated grammatical, syntax, homophones, and other interpretive errors are inadvertently transcribed by the computer software. Please disregard these errors. Please excuse any errors that have escaped final proofreading.      CC: Avelino Sheth MD

## 2023-01-24 NOTE — PROGRESS NOTES
Problem: Mobility Impaired (Adult and Pediatric)  Goal: *Acute Goals and Plan of Care (Insert Text)  Description: In 1-7 days pt will be able to perform:  ST.  Bed mobility:  Rolling L to R to L modified independent for positioning. 2.  Supine to sit to supine CGA with HR for meals. 3.  Sit to stand to sit CGA with RW in prep for ambulation. LT.  Gait:  Ambulate >30ft CGA/min A with RW, WBAT, for home/community mobility. 2.  Activity tolerance: Tolerate up in chair 1-2 hours for ADL's.  3.  Patient/Family Education:  Patient/family to be independent with HEP for follow-up care and safe discharge. Outcome: Progressing Towards Goal  Note: []  Patient has met MD mobilization cherri for d/c home   []  Recommend HH with 24 hour adult care   [x]  Benefit from additional acute PT session to address:  transfer training, gait training    PHYSICAL THERAPY TREATMENT    Patient: Donato Rodriguez (47 y.o. male)  Date: 2023  Diagnosis: Intertrochanteric fracture of left femur ([address] [S72.142A] Intertrochanteric fracture of left femur ([facility])  Procedure(s) (LRB):  FEMUR INSERTION INTRA MEDULLARY NAIL, LEFT WITH C-ARM (Left) 2 Days Post-Op  Precautions: Fall, WBAT      ASSESSMENT:  Pt supine in bed upon arrival.  Pt able to transfer supine<>sit mod A x2 today requiring additional time. Sit<>stand min/mod A x2 w/ bed elevated. In standing pt shifts wt to R and required min Ax2 for forward and lateral steps using RW, WBAT ad GB. Pt has difficulty WB,accepting wt onto L LE. Pt appears more fragile today and required additional A for all mobility. Recommend hospital bed, BSC and w/c for home due to pt increased need for A. Pt wanting to return home w/ family assistance. Family was not present in room during session. Pt seen w/ OT for second pair of skilled hands. Pt left supine in bed w/ all needs within reach and bed alarm activated.   Progression toward goals:   []      Improving appropriately and progressing toward goals  [x]      Improving slowly and progressing toward goals  []      Not making progress toward goals and plan of care will be adjusted     PLAN:  Patient continues to benefit from skilled intervention to address the above impairments. Continue treatment per established plan of care. Discharge Recommendations: Home Health vs rehab  Further Equipment Recommendations for Discharge:  bedside commode, hospital bed, wheelchair   [facility]: 9/20    This AMPA score should be considered in conjunction with interdisciplinary team recommendations to determine the most appropriate discharge setting. Patient's social support, diagnosis, medical stability, and prior level of function should also be taken into consideration. SUBJECTIVE:   Patient gave therapist thumbs up sign. OBJECTIVE DATA SUMMARY:   Critical Behavior:  Neurologic State: Alert  Orientation Level: Oriented to person  Cognition: Decreased command following  Safety/Judgement: Insight into deficits  Functional Mobility Training:  Bed Mobility:  Supine to Sit: Minimum assistance;Assist x1;Additional time (vc)  Sit to Supine: Moderate assistance;Assist x2 (vc)  Scooting: Minimum assistance (vc)  Transfers:  Sit to Stand: Minimum assistance;Assist x2; Moderate assistance  Stand to Sit: Moderate assistance;Assist x2 (vc)  Balance:  Sitting: Intact  Standing: Impaired; With support  Standing - Static: Fair;Constant support  Standing - Dynamic : Poor   Range Of Motion:   Posture:   Wheelchair Mobility:   Ambulation/Gait Training:  Distance (ft): 1 Feet (ft)  Assistive Device: Walker, rolling;Gait belt  Ambulation - Level of Assistance: Minimal assistance; Moderate assistance;Assist x2; Additional time (vc)  Gait Abnormalities: Antalgic;Decreased step clearance; Step to gait (L knee flexed throughout)  Left Side Weight Bearing: As tolerated  Base of Support: Shift to right;Narrowed  Stance: Left decreased  Speed/Payal: Slow;Delayed  Step Length: Left shortened;Right shortened  Swing Pattern: Left asymmetrical;Right asymmetrical  Interventions: Safety awareness training; Tactile cues; Verbal cues  Neuro Re-Education:  Therapeutic Exercises:   Pain:  Pain level pre-treatment: 7/10  Pain level post-treatment: 7/10   Pain Intervention(s): Medication (see MAR); Rest, Ice, Repositioning   Response to intervention: Nurse notified, See doc flow    Activity Tolerance:   Fair   Please refer to the flowsheet for vital signs taken during this treatment. After treatment:   [] Patient left in no apparent distress sitting up in chair  [x] Patient left in no apparent distress in bed  [x] Call bell left within reach  [] Nursing notified  [] Caregiver present  [x] Bed alarm activated  [] SCDs applied      COMMUNICATION/EDUCATION:   [x]         Role of Physical Therapy in the acute care setting. [x]         Fall prevention education was provided and the patient/caregiver indicated understanding. [x]         Patient/family have participated as able in working toward goals and plan of care. [x]         Patient/family agree to work toward stated goals and plan of care. []         Patient understands intent and goals of therapy, but is neutral about his/her participation. []         Patient is unable to participate in stated goals/plan of care: ongoing with therapy staff.  []         Other:        Tim Mchugh, PT   Time Calculation: 23 mins    [facility] AM-PAC® Basic Mobility Inpatient Short Form (6-Clicks) Version 2    How much HELP from another person does the patient currently need    (If the patient hasn't done an activity recently, how much help from another person do you think he/she would need if he/she tried?)   Total (Total A or Dep)   A Lot  (Mod to Max A)   A Little (Sup or Min A)   None (Mod I to I)   Turning from your back to your side while in a flat bed without using bedrails? [] 1 [x] 2 [] 3 [] 4   2.  Moving from lying on your back to sitting on the side of a flat bed without using bedrails? [] 1 [x] 2 [] 3 [] 4   3. Moving to and from a bed to a chair (including a wheelchair)? [x] 1 [] 2 [] 3 [] 4   4. Standing up from a chair using your arms (e.g., wheelchair, or bedside chair)? [] 1 [x] 2 [] 3 [] 4   5. Walking in hospital room? [] 1 [x] 2 [] 3 [] 4   6. Climbing 3-5 steps with a railing?+   [] 1 [] 2 [] 3 [] 4   +If stair climbing cannot be assessed, skip item #6. Sum responses from items 1-5. Based on an AM-PAC score of **/24 (or 9/20 if omitting stairs) and their current functional mobility deficits, it is recommended that the patient have 5-7 sessions per week of Physical Therapy at d/c to increase the patient's independence. Based on an AM-PAC score of **/24 (**/20 if omitting stairs) and their current functional mobility deficits, it is recommended that the patient have 3-5 sessions per week of Physical Therapy at d/c to increase the patient's independence. Based on an AM-PAC score of **/24 (or **/20 if omitting stairs) and their current functional mobility deficits, it is recommended that the patient have 2-3 sessions per week of Physical Therapy at d/c to increase the patient's independence. At this time and based on an AM-PAC score of **/24 (or **/20 if omitting stairs), no further PT is recommended upon discharge due to (i.e. patient at baseline functional statusetc). Recommend patient returns to prior setting with prior services.

## 2023-01-24 NOTE — PROGRESS NOTES
Comprehensive Nutrition Assessment    Type and Reason for Visit: Initial    Nutrition Recommendations/Plan:   Supplement Glucerna 1x daily. Monitor % meal intake, wt, and POC while admitted. Malnutrition Assessment:  Malnutrition Status: At risk for malnutrition (specify) (01/24/23 1328)      Nutrition Assessment:    Pt with h/o DM, HTN, throat cancer s/p tracheostomy, lung cancer under chemo, PAD s/p L fem-pop bypass, R EIA/ SFA stents, legally blind. Presented with concerns of fall. Admitted with Left intertrochanteric femur fracture S/p left hip intramedullary nail fixation 1/22. In basket triggered for EN/PN PTA. Pt currently on PO easy to chew diet. Noted h/o feeding tube. No wt lost per chart review: 174lb (10/10/22), 175lb (9/23/22), 178lb (1/28/22). Spoke with RN in AM- pt eats alittle; normally wife is with pt to help with meals. Attempted to visit pt in afternoon however was busy. Nutrition Related Findings:    Labs and meds reviewed - pericolace, humalog, synthroid, ferrous sulfate. BM 1/21. Wound Type: Surgical incision, Skin tears    Current Nutrition Intake & Therapies:  Average Meal Intake: Unable to assess  Average Supplement Intake: None ordered  ADULT DIET Easy to Chew    Anthropometric Measures:  Height: 6' 4\" (193 cm)  Ideal Body Weight (IBW): 202 lbs (92 kg)     Current Body Wt:  84.3 kg (185 lb 13.6 oz) (1/24/23), 92 % IBW.  Bed scale  Current BMI (kg/m2): 22.6  Usual Body Weight: 78.9 kg (174 lb) (10/10/22)  % Weight Change (Calculated): 6.8                         Estimated Daily Nutrient Needs:  Energy Requirements Based On: Kcal/kg  Weight Used for Energy Requirements: Current (25-30kcals)  Energy (kcal/day): 2108-2529  Weight Used for Protein Requirements: Current  Protein (g/day): 84  Method Used for Fluid Requirements: 1 ml/kcal  Fluid (ml/day): 2108-2529    Nutrition Diagnosis:   Predicted inadequate energy intake related to acute injury/trauma as evidenced by other (specify) (minimal intake per RN report)    Nutrition Interventions:   Food and/or Nutrient Delivery: Continue current diet, Start oral nutrition supplement  Nutrition Education/Counseling: No recommendations at this time  Coordination of Nutrition Care: Continue to monitor while inpatient, Interdisciplinary rounds       Goals:     Goals: PO intake 50% or greater, by next RD assessment       Nutrition Monitoring and Evaluation:   Behavioral-Environmental Outcomes: None identified  Food/Nutrient Intake Outcomes: Food and nutrient intake, Diet advancement/tolerance, Supplement intake  Physical Signs/Symptoms Outcomes: Biochemical data, Meal time behavior, Nutrition focused physical findings, Skin    Discharge Planning:    Continue current diet, Continue oral nutrition supplement    Andres Quiroz RD

## 2023-01-24 NOTE — PROGRESS NOTES
Problem: Pressure Injury - Risk of  Goal: *Prevention of pressure injury  Description: Document Uday Scale and appropriate interventions in the flowsheet. Outcome: Progressing Towards Goal  Note: Pressure Injury Interventions:  Sensory Interventions: Keep linens dry and wrinkle-free, Pad between skin to skin, Minimize linen layers, Turn and reposition approx. every two hours (pillows and wedges if needed)    Moisture Interventions: Absorbent underpads    Activity Interventions: Pressure redistribution bed/mattress(bed type), PT/OT evaluation    Mobility Interventions: Pressure redistribution bed/mattress (bed type), Turn and reposition approx. every two hours(pillow and wedges)    Nutrition Interventions: Document food/fluid/supplement intake, Offer support with meals,snacks and hydration    Friction and Shear Interventions: Minimize layers                Problem: Falls - Risk of  Goal: *Absence of Falls  Description: Document Shaheen Fall Risk and appropriate interventions in the flowsheet.   Outcome: Progressing Towards Goal  Note: Fall Risk Interventions:  Mobility Interventions: Bed/chair exit alarm, Patient to call before getting OOB, PT Consult for mobility concerns, PT Consult for assist device competence, Utilize walker, cane, or other assistive device, Strengthening exercises (ROM-active/passive)         Medication Interventions: Assess postural VS orthostatic hypotension, Patient to call before getting OOB, Bed/chair exit alarm    Elimination Interventions: Bed/chair exit alarm, Call light in reach    History of Falls Interventions: Bed/chair exit alarm, Door open when patient unattended, Room close to nurse's station         Problem: Pain  Goal: *Control of Pain  Outcome: Progressing Towards Goal     Problem: Nausea/Vomiting (Adult)  Goal: *Absence of nausea/vomiting  Outcome: Progressing Towards Goal     Problem: Patient Education: Go to Patient Education Activity  Goal: Patient/Family Education  Outcome: Progressing Towards Goal

## 2023-01-24 NOTE — PROGRESS NOTES
Problem: Self Care Deficits Care Plan (Adult)  Goal: *Acute Goals and Plan of Care (Insert Text)  Description: Initial Occupational Therapy Goals (1/23/2023) Within 7 day(s):    1. Patient will perform grooming standing sinkside with CGA for increased independence with ADLs. 2. Patient will perform LB dressing with min A & A/E PRN for increased independence with ADLs. 3. Patient will perform toilet transfer with CGA for increased independence with ADLs. 4. Patient will perform all aspects of toileting with CGA for increased independence with ADLs. 5. Patient will independently apply energy conservation techniques with 1 verbal cue(s)for increased independence with ADLs. 6. Patient will perform bathroom mobility with CGA for increased independence/safety with ADLs. Outcome: Progressing Towards Goal  OCCUPATIONAL THERAPY TREATMENT    Patient: Berna Saenz (85 y.o. male)  Date: 1/24/2023  Diagnosis: Intertrochanteric fracture of left femur ([facility] [address] [S72.142A] Intertrochanteric fracture of left femur (HCC)  Procedure(s) (LRB):  FEMUR INSERTION INTRA MEDULLARY NAIL, LEFT WITH C-ARM (Left) 2 Days Post-Op  Precautions: Fall, WBAT    Chart, occupational therapy assessment, plan of care, and goals were reviewed. ASSESSMENT:  Pt found supine in bed, reporting moderate pain, agreeable to therapy. Pt requiring more assist for functional mobility during today's session. Pt requesting to use suction to manage secretions, able to manage with min A. Pt sat up to EOB with min A, and required mod Ax2 for STS. Pt demonstrates decreased command following, difficulty with taking side steps, pt steps forwards instead. Pt then requires seated rest break, mod Ax2 required to assist pt back to bed. Pt required mod Ax2 to return to supine, call bell/suction within reach. Recommend rehab placement at hospital d/c, if pt returns home recommend hospital bed, w/c, BSC.     Progression toward goals:  []          Improving appropriately and progressing toward goals  [x]          Improving slowly and progressing toward goals  []          Not making progress toward goals and plan of care will be adjusted     PLAN:  Patient continues to benefit from skilled intervention to address the above impairments. Continue treatment per established plan of care. Discharge Recommendations:  Rehab (vs. Home health with 24/7 family assist)  Further Equipment Recommendations for Discharge: if patient d/c to home, recommend hospital bed, w/c, [facility]    AMPAC: Based on an AM-PAC score of 14/24 and their current ADL deficits; it is recommended that the patient have 3-5 sessions per week of Occupational Therapy at d/c to increase the patient's independence. This AMPAC score should be considered in conjunction with interdisciplinary team recommendations to determine the most appropriate discharge setting. Patient's social support, diagnosis, medical stability, and prior level of function should also be taken into consideration. SUBJECTIVE:   Patient signed thumbs up at end of session    OBJECTIVE DATA SUMMARY:   Cognitive/Behavioral Status:  Neurologic State: Alert  Orientation Level: Oriented to person  Cognition: Decreased command following  Safety/Judgement: Insight into deficits    Functional Mobility and Transfers for ADLs:   Bed Mobility:  Supine to Sit: Minimum assistance;Assist x1;Additional time (vc)  Sit to Supine: Moderate assistance;Assist x2 (vc)  Scooting: Minimum assistance (vc)   Transfers:  Sit to Stand: Minimum assistance;Assist x2; Moderate assistance    Balance:  Sitting: Intact  Standing: Impaired; With support  Standing - Static: Fair;Constant support  Standing - Dynamic : Poor    ADL Intervention:  Grooming  Grooming Assistance: Minimum assistance  Position Performed: Seated edge of bed  Brushing Teeth: Minimum assistance (suction)    Cognitive Retraining  Safety/Judgement: Insight into deficits    Pain:  Pain level pre-treatment: 5/10   Pain level post-treatment: 5/10  Pain Intervention(s): Rest, Ice, Repositioning   Response to intervention: Nurse notified, See doc flow sheet    Activity Tolerance:    Poor. Pt limited by pain, strength, ROM, balance    Please refer to the flowsheet for vital signs taken during this treatment. After treatment:   []  Patient left in no apparent distress sitting up in chair  [x]  Patient left in no apparent distress in bed  [x]  Call bell left within reach  [x]  Nursing notified  []  Caregiver present  []  Bed alarm activated    COMMUNICATION/EDUCATION:   [x] Role of Occupational Therapy in the acute care setting  [x] Home safety education was provided and the patient/caregiver indicated understanding. [x] Patient/family have participated as able in working towards goals and plan of care. [x] Patient/family agree to work toward stated goals and plan of care. [] Patient understands intent and goals of therapy, but is neutral about his/her participation. [] Patient is unable to participate in goal setting and plan of care. Thank you for this referral.  Luciano Carney OTR/L  Time Calculation: 23 mins    [employer] AM-PAC® Daily Activity Inpatient Short Form (6-Clicks)*    How much HELP from another person does the patient currently need    (If the patient hasn't done an activity recently, how much help from another person do you think he/she would need if he/she tried?)   Total (Total A or Dep)   A Lot  (Mod to Max A)   A Little (Sup or Min A)   None (Mod I to I)   Putting on and taking off regular lower body clothing? [] 1 [x] 2 [] 3 [] 4   2. Bathing (including washing, rinsing,      drying)? [] 1 [x] 2 [] 3 [] 4   3. Toileting, which includes using toilet, bedpan or urinal?   [] 1 [x] 2 [] 3 [] 4   4. Putting on and taking off regular upper body clothing? [] 1 [x] 2 [] 3 [] 4   5. Taking care of personal grooming such as brushing teeth? [] 1 [] 2 [x] 3 [] 4   6. Eating meals?    [] 1 [] 2 [x] 3 [] 4     Based on an AM-PAC score of 14/24 and their current ADL deficits; it is recommended that the patient have 3-5 sessions per week of Occupational Therapy at d/c to increase the patient's independence.

## 2023-01-25 VITALS
HEIGHT: 76 IN | RESPIRATION RATE: 18 BRPM | BODY MASS INDEX: 22.63 KG/M2 | SYSTOLIC BLOOD PRESSURE: 137 MMHG | TEMPERATURE: 98.2 F | WEIGHT: 185.85 LBS | OXYGEN SATURATION: 98 % | HEART RATE: 96 BPM | DIASTOLIC BLOOD PRESSURE: 88 MMHG

## 2023-01-25 LAB
ABO + RH BLD: NORMAL
ANION GAP SERPL CALC-SCNC: 12 MMOL/L (ref 3–18)
BLD PROD TYP BPU: NORMAL
BLOOD GROUP ANTIBODIES SERPL: NORMAL
BPU ID: NORMAL
BUN SERPL-MCNC: 12 MG/DL (ref 7–18)
BUN/CREAT SERPL: 15 (ref 12–20)
CALCIUM SERPL-MCNC: 7.9 MG/DL (ref 8.5–10.1)
CALLED TO:,BCALL2: NORMAL
CHLORIDE SERPL-SCNC: 109 MMOL/L (ref 100–111)
CO2 SERPL-SCNC: 18 MMOL/L (ref 21–32)
CREAT SERPL-MCNC: 0.78 MG/DL (ref 0.6–1.3)
CROSSMATCH RESULT,%XM: NORMAL
ERYTHROCYTE [DISTWIDTH] IN BLOOD BY AUTOMATED COUNT: 17.2 % (ref 11.6–14.5)
GLUCOSE BLD STRIP.AUTO-MCNC: 100 MG/DL (ref 70–110)
GLUCOSE BLD STRIP.AUTO-MCNC: 82 MG/DL (ref 70–110)
GLUCOSE SERPL-MCNC: 86 MG/DL (ref 74–99)
HCT VFR BLD AUTO: 23.1 % (ref 36–48)
HGB BLD-MCNC: 7.7 G/DL (ref 13–16)
MCH RBC QN AUTO: 28.4 PG (ref 24–34)
MCHC RBC AUTO-ENTMCNC: 33.3 G/DL (ref 31–37)
MCV RBC AUTO: 85.2 FL (ref 78–100)
NRBC # BLD: 0.02 K/UL (ref 0–0.01)
NRBC BLD-RTO: 0.4 PER 100 WBC
PLATELET # BLD AUTO: 50 K/UL (ref 135–420)
PMV BLD AUTO: 10.3 FL (ref 9.2–11.8)
POTASSIUM SERPL-SCNC: 3.5 MMOL/L (ref 3.5–5.5)
RBC # BLD AUTO: 2.71 M/UL (ref 4.35–5.65)
SODIUM SERPL-SCNC: 139 MMOL/L (ref 136–145)
SPECIMEN EXP DATE BLD: NORMAL
STATUS OF UNIT,%ST: NORMAL
UNIT DIVISION, %UDIV: 0
WBC # BLD AUTO: 4.6 K/UL (ref 4.6–13.2)

## 2023-01-25 PROCEDURE — 85027 COMPLETE CBC AUTOMATED: CPT

## 2023-01-25 PROCEDURE — 97530 THERAPEUTIC ACTIVITIES: CPT

## 2023-01-25 PROCEDURE — 80048 BASIC METABOLIC PNL TOTAL CA: CPT

## 2023-01-25 PROCEDURE — 74011000250 HC RX REV CODE- 250: Performed by: ORTHOPAEDIC SURGERY

## 2023-01-25 PROCEDURE — 92526 ORAL FUNCTION THERAPY: CPT

## 2023-01-25 PROCEDURE — 74011250637 HC RX REV CODE- 250/637: Performed by: ORTHOPAEDIC SURGERY

## 2023-01-25 PROCEDURE — 82962 GLUCOSE BLOOD TEST: CPT

## 2023-01-25 PROCEDURE — 36415 COLL VENOUS BLD VENIPUNCTURE: CPT

## 2023-01-25 PROCEDURE — 97110 THERAPEUTIC EXERCISES: CPT

## 2023-01-25 PROCEDURE — 97116 GAIT TRAINING THERAPY: CPT

## 2023-01-25 RX ADMIN — ACETAZOLAMIDE 250 MG: 250 TABLET ORAL at 08:28

## 2023-01-25 RX ADMIN — SODIUM CHLORIDE, PRESERVATIVE FREE 10 ML: 5 INJECTION INTRAVENOUS at 06:31

## 2023-01-25 RX ADMIN — LEVOTHYROXINE SODIUM 25 MCG: 0.03 TABLET ORAL at 08:35

## 2023-01-25 RX ADMIN — ACETAMINOPHEN 1000 MG: 500 TABLET ORAL at 06:32

## 2023-01-25 RX ADMIN — FERROUS SULFATE TAB 325 MG (65 MG ELEMENTAL FE) 325 MG: 325 (65 FE) TAB at 08:28

## 2023-01-25 NOTE — ROUTINE PROCESS
Received bedside report from [facility], report included SBAR, MAR, and Kardex.

## 2023-01-25 NOTE — PROGRESS NOTES
D/C Plan: [facility] with physician follow up     Noted pt is with [facility]. CM spoke with [facility] and have been notified they partner with [facility] and [facility]. CM and provider met with pt and his wife at bedside to discuss [place] options. Family is in agreement with a [place] agency that partners with [facility]. [facility] is unable to assist and [facility] will assist.  Anticipate pt will transition home today with the above services. Medical transport has been arranged. No other care transition needs have been identifeid.     Care Management Interventions  Mode of Transport at Discharge: S  Transition of Care Consult (CM Consult): Discharge Planning, [address] Maintenance Reviewed: Yes  Physical Therapy Consult: Yes  Occupational Therapy Consult: Yes  Speech Therapy Consult: Yes  Support Systems: Child(rich), Spouse/Significant Other  Confirm Follow Up Transport: Family  The Plan for Transition of Care is Related to the Following Treatment Goals : [place] with physician follow up  The Patient and/or Patient Representative was Provided with a Choice of Provider and Agrees with the Discharge Plan?: Yes  Name of the Patient Representative Who was Provided with a Choice of Provider and Agrees with the Discharge Plan: spouse  Freedom of Choice List was Provided with Basic Dialogue that Supports the Patient's Individualized Plan of Care/Goals, Treatment Preferences and Shares the Quality Data Associated with the Providers?: Yes  Discharge Location  Patient Expects to be Discharged to[de-identified] Home with home health

## 2023-01-25 NOTE — PROGRESS NOTES
Problem: Pressure Injury - Risk of  Goal: *Prevention of pressure injury  Description: Document Uday Scale and appropriate interventions in the flowsheet. Outcome: Progressing Towards Goal  Note: Pressure Injury Interventions:  Sensory Interventions: Minimize linen layers    Moisture Interventions: Absorbent underpads    Activity Interventions: Pressure redistribution bed/mattress(bed type), PT/OT evaluation    Mobility Interventions: PT/OT evaluation, Pressure redistribution bed/mattress (bed type)    Nutrition Interventions: Document food/fluid/supplement intake    Friction and Shear Interventions: Minimize layers                Problem: Patient Education: Go to Patient Education Activity  Goal: Patient/Family Education  Outcome: Progressing Towards Goal     Problem: Falls - Risk of  Goal: *Absence of Falls  Description: Document Shaheen Fall Risk and appropriate interventions in the flowsheet. Outcome: Progressing Towards Goal  Note: Fall Risk Interventions:  Mobility Interventions: Strengthening exercises (ROM-active/passive), Utilize walker, cane, or other assistive device, PT Consult for assist device competence, PT Consult for mobility concerns, Patient to call before getting OOB         Medication Interventions: Teach patient to arise slowly, Patient to call before getting OOB, Bed/chair exit alarm    Elimination Interventions: Call light in reach, Bed/chair exit alarm    History of Falls Interventions: Bed/chair exit alarm, Room close to nurse's station, Utilize gait belt for transfer/ambulation         Problem: Patient Education: Go to Patient Education Activity  Goal: Patient/Family Education  Outcome: Progressing Towards Goal     Problem: Risk for Spread of Infection  Goal: Prevent transmission of infectious organism to others  Description: Prevent the transmission of infectious organisms to other patients, staff members, and visitors.   Outcome: Progressing Towards Goal     Problem: Patient Education:  Go to Education Activity  Goal: Patient/Family Education  Outcome: Progressing Towards Goal     Problem: Pain  Goal: *Control of Pain  Outcome: Progressing Towards Goal     Problem: Nausea/Vomiting (Adult)  Goal: *Absence of nausea/vomiting  Outcome: Progressing Towards Goal     Problem: Fluid Volume - Risk of, Imbalanced  Goal: *Balanced intake and output  Outcome: Progressing Towards Goal     Problem: Patient Education: Go to Patient Education Activity  Goal: Patient/Family Education  Outcome: Progressing Towards Goal     Problem: Patient Education: Go to Patient Education Activity  Goal: Patient/Family Education  Outcome: Progressing Towards Goal     Problem: Patient Education: Go to Patient Education Activity  Goal: Patient/Family Education  Outcome: Progressing Towards Goal

## 2023-01-25 NOTE — PROGRESS NOTES
Problem: Dysphagia (Adult)  Goal: *Acute Goals and Plan of Care (Insert Text)  Description: Patient will:  1. Tolerate PO trials with 0 s/s overt distress in 4/5 trials. 2. Utilize compensatory swallow strategies/maneuvers (decrease bite/sip, size/rate, alt. liq/sol) with min cues in 4/5 trials. 3. Perform oral-motor/laryngeal exercises to increase oropharyngeal swallow function with min cues. 4. Complete an objective swallow study (i.e., MBSS) to assess swallow integrity, r/o aspiration, and determine of safest LRD, min A.    Rec:     Easy to chew, thin liquid - alternate solids/liquids  Aspiration precautions  HOB >45 during po intake, remain >30 for 30-45 minutes after po   Small bites/sips; alternate liquid/solid with slow feeding rate   Oral care TID  Meds per pt preference   Outcome: Progressing Towards Goal     SPEECH LANGUAGE PATHOLOGY DYSPHAGIA TREATMENT    Patient: Yesi Sam (86 y.o. male)  Date: 1/25/2023  Diagnosis: Intertrochanteric fracture of left femur (HCC) [S72.142A] Intertrochanteric fracture of left femur (HCC)  Procedure(s) (LRB):  FEMUR INSERTION INTRA MEDULLARY NAIL, LEFT WITH C-ARM (Left) 3 Days Post-Op  Precautions:  Fall, WBAT  PLOF:as per H&P     ASSESSMENT:  Pt seen on this date for dysphagia follow up. Wife at bedside. Clinician educated wife and pt on total laryngectomy anatomy- they verbalized understanding. Pt seen with breakfast tray at bedside- able to manipulate and clear all consistencies consumed. 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 Pt communicated via white board that he does not have globus sensation or issues with pharyngeal clearance of solid PO. He further reported once in awhile it felt as if PO \"got stuck here\" then pointed to chest- suggestive of possible esophageal problems. Clinician reviewed orders for MBSS however, pt wrote he was tired of tests and didn't feel it was necessary.  ST followed up with RN and Dr. James Marie to discontinue orders. Recommend pt follow up with GI to further assess esophageal motility/function. Continue current easy to chew diet with thin liquids. SLP following. Progression toward goals:  [x]         Improving appropriately and progressing toward goals  []         Improving slowly and progressing toward goals  []         Not making progress toward goals and plan of care will be adjusted     PLAN:  Recommendations and Planned Interventions:  See above  Patient continues to benefit from skilled intervention to address the above impairments. Continue treatment per established plan of care. SUBJECTIVE:   Patient wrote \"Don't worry, be happy. OBJECTIVE:   Cognitive and Communication Status:  Neurologic State: Alert  Orientation Level: Oriented to person, Oriented to place  Cognition: Decreased command following  Perception: Verbal, Tactile  Perseveration: No perseveration noted  Safety/Judgement: Insight into deficits  Dysphagia Treatment:  Oral Assessment:  Oral Assessment  Labial: No impairment  Dentition: Edentulous (normally wears dentures)  Oral Hygiene:  (fair)  Lingual: No impairment  Velum: Unable to visualize  Mandible: No impairment  Gag Reflex:  (did not test)  P.O. Trials:   Patient Position:  (HOB>45)   Vocal quality prior to P.O.: Aphonic   Consistency Presented: Solid, Thin liquid   How Presented: Cup/sip, Self-fed/presented, Successive swallows   Bolus Acceptance: No impairment   Bolus Formation/Control: Impaired   Type of Impairment: Incomplete, Lip closure, Mastication   Propulsion: No impairment   Oral Residue: None   Initiation of Swallow: No impairment   Laryngeal Elevation: Other (comment) (s/p total laryngectomy)   Aspiration Signs/Symptoms: None   Pharyngeal Phase Characteristics: Other (comment) (no overt s/sx)   Effective Modifications:  Alternate liquids/solids, Small sips and bites   Cues for Modifications: Minimal   Oral Phase Severity: Mild   Pharyngeal Phase Severity : (suspect)     PAIN:  Pain level pre-treatment: non reported/10   Pain level post-treatment: non reported/10   After treatment:   []              Patient left in no apparent distress sitting up in chair  [x]              Patient left in no apparent distress in bed  [x]              Call bell left within reach  [x]              Nursing notified  [x]              Family present  []              Caregiver present  []              Bed alarm activated      COMMUNICATION/EDUCATION:   [x] Aspiration precautions; swallow safety; compensatory techniques  []        Patient unable to participate in education; education ongoing with staff  [x]  Posted safety precautions in patient's room.   [] Oral-motor/laryngeal strengthening exercises      Gato Herrera M.S., CCC-SLP  Speech-Language Pathologist

## 2023-01-25 NOTE — ROUTINE PROCESS
Received bedside report from [address] [place] [address], report included SBAR, MAR, and Kardex. Patient urinated three times during the night in his brief and once in the urinal; output for the urinal was 300 ml. Patient also had three small loose dark stools during the night. Gave bedside report to [address] [place] [address].

## 2023-01-25 NOTE — PROGRESS NOTES
Hospitalist Progress Note    Patient: Janell Rand MRN: [id number]  CSN: [id number]    YOB: 1955  Age: 79 y.o. Sex: male    DOA: 1/21/2023 LOS:  LOS: 4 days                Assessment/Plan     Patient Active Problem List   Diagnosis Code    Intertrochanteric fracture of left femur ([facility] [address] S72.142A    Diabetes ([facility] [address] E11.9    Hypertension I10    Throat cancer ([facility] [address] C14.0    Lung cancer ([facility] [address] C34.90    Tracheostomy in place ([facility] [address] Z93.0    PAD (peripheral artery disease) ([facility] [address] I73.9    Legally blind H54.8    Thrombocytopenia ([facility] [address] D69.6    Anemia D64.9        Chief complaint :  Fall  79 y.o. male with DM, HTN, throat ca, s/p tracheostomy, lung ca under chemo, PAD S/p L fem-pop bypass, R EIA/ SFA stents, legally blind presents to ER with concerns of fall. Left intertrochanteric femur fracture -  S/p left hip intramedullary nail fixation. 01/22/2023. Acute blood loss anemia -  Blood transfusion ordered  Transfuse to keep Hb > 7     DM -  Started on SSI. HTN -  Continue home meds  Monitor BP     History of throat ca -  Tracheostomy in place  Pureed diet. Lung ca -  Under chemotherapy  Followed by Dr. Keren Jalloh     PAD -  S/p L fem-pop bypass, R EIA/ SFA stents. Hold aspirin    Thrombocytopenia -  Secondary to chemotherapy  Received platelets before surgery     Legally blind     Post op DVT prophylaxis, PT/OT per ortho. Disposition : 1-2 days. Review of systems  General: No fevers or chills. Cardiovascular: No chest pain or pressure. No palpitations. Pulmonary: No shortness of breath. Gastrointestinal: No nausea, vomiting. Physical Exam:  General: Awake, cooperative, no acute distress    HEENT: NC, Atraumatic. Trach in place  Lungs: CTA Bilaterally. No Wheezing/Rhonchi/Rales. Heart:  S1 S2,  No murmur, No Rubs, No Gallops  Abdomen: Soft, Non distended, Non tender. +Bowel sounds,   Extremities: No c/c/e  Psych:   Not anxious or agitated.   Neurologic:  No acute neurological deficit. Vital signs/Intake and Output:  Visit Vitals  /78   Pulse 97   Temp 98.4 °F (36.9 °C)   Resp 18   Ht 6' 4\" (1.93 m)   Wt 84.3 kg (185 lb 13.6 oz)   SpO2 99%   BMI 22.62 kg/m²     Current Shift:  01/25 0701 - 01/25 1900  In: -   Out: 300 [Urine:300]  Last three shifts:  01/23 1901 - 01/25 0700  In: 767.5   Out: 2200 [Urine:2200]            Labs: Results:       Chemistry Recent Labs     01/25/23  0210 01/24/23  0550 01/23/23  0350   GLU 86 111* 172*    140 142   K 3.5 3.8 4.2    111 115*   CO2 18* 20* 20*   BUN 12 14 25*   CREA 0.78 0.81 1.29   CA 7.9* 7.9* 7.8*   AGAP 12 9 7   BUCR 15 17 19      CBC w/Diff Recent Labs     01/25/23  0210 01/24/23  1049 01/24/23  0550 01/23/23  1730 01/23/23  0350   WBC 4.6  --  4.3*  --  4.8   RBC 2.71*  --  2.68*  --  1.66*   HGB 7.7* 8.4* 7.6*   < > 4.8*   HCT 23.1* 25.0* 23.0*   < > 14.6*   PLT 50*  --  51*  --  62*    < > = values in this interval not displayed. Cardiac Enzymes No results for input(s): CPK, CKND1, FRANCES in the last 72 hours. No lab exists for component: CKRMB, TROIP   Coagulation No results for input(s): PTP, INR, APTT, INREXT, INREXT in the last 72 hours. Lipid Panel No results found for: CHOL, CHOLPOCT, CHOLX, CHLST, CHOLV, 102854, HDL, HDLP, LDL, LDLC, DLDLP, 129774, VLDLC, VLDL, TGLX, TRIGL, TRIGP, TGLPOCT, CHHD, CHHDX   BNP No results for input(s): BNPP in the last 72 hours. Liver Enzymes No results for input(s): TP, ALB, TBIL, AP in the last 72 hours.     No lab exists for component: SGOT, GPT, DBIL   Thyroid Studies No results found for: T4, T3U, TSH, TSHEXT, TSHEXT     Procedures/imaging: see electronic medical records for all procedures/Xrays and details which were not copied into this note but were reviewed prior to creation of Plan

## 2023-01-25 NOTE — PROGRESS NOTES
Patient unable to be discharged  at 1700 when transport arrived due to no urine output. Spoke to  who states patient can not discharge without voiding. His don was removed at 1400 today, patient has not voided yet (2030) bladder scan at 1720 showed 385cc. Will continue to encourage voiding and bladder scan. Patients wife aware, patient aware. Ketoconazole Counseling:   Patient counseled regarding improving absorption with orange juice.  Adverse effects include but are not limited to breast enlargement, headache, diarrhea, nausea, upset stomach, liver function test abnormalities, taste disturbance, and stomach pain.  There is a rare possibility of liver failure that can occur when taking ketoconazole. The patient understands that monitoring of LFTs may be required, especially at baseline. The patient verbalized understanding of the proper use and possible adverse effects of ketoconazole.  All of the patient's questions and concerns were addressed.

## 2023-01-25 NOTE — PROGRESS NOTES
Problem: Mobility Impaired (Adult and Pediatric)  Goal: *Acute Goals and Plan of Care (Insert Text)  Description: In 1-7 days pt will be able to perform:  ST.  Bed mobility:  Rolling L to R to L modified independent for positioning. 2.  Supine to sit to supine CGA with HR for meals. 3.  Sit to stand to sit CGA with RW in prep for ambulation. LT.  Gait:  Ambulate >30ft CGA/min A with RW, WBAT, for home/community mobility. 2.  Activity tolerance: Tolerate up in chair 1-2 hours for ADL's.  3.  Patient/Family Education:  Patient/family to be independent with HEP for follow-up care and safe discharge. Outcome: Progressing Towards Goal   []  Patient has met MD mobilization cherri for d/c home   []  Recommend HH with 24 hour adult care   []  Benefit from additional acute PT session to address:      PHYSICAL THERAPY TREATMENT    Patient: Shawanda Gonzalez (45 y.o. male)  Date: 2023  Diagnosis: Intertrochanteric fracture of left femur ([address] [S72.142A] Intertrochanteric fracture of left femur (HCC)  Procedure(s) (LRB):  FEMUR INSERTION INTRA MEDULLARY NAIL, LEFT WITH C-ARM (Left) 3 Days Post-Op  Precautions: Fall, WBAT  PLOF:     ASSESSMENT:  Pt is eager to participate (excitedly using thumbs up). Agrees to moderate L hip pain. Exercises performed bilaterally, with AA for L LE. Improved transition to EOB. 3 min to progress to unsupported sitting. Min/mod to stand with VCs for appropriate hand placement. No lightheadedness. Manual assist to advance L LE. WB is altered due to pain and slight knee flexion contracture. After 2 short trials, pt not \"Tired\" on communication board. Assisted back to supine and positioned for comfort. Placement is suggested for this motivated pt.    Progression toward goals: Fair  [x]      Improving appropriately and progressing toward goals  []      Improving slowly and progressing toward goals  []      Not making progress toward goals and plan of care will be adjusted PLAN:  Patient continues to benefit from skilled intervention to address the above impairments. Continue treatment per established plan of care. Discharge Recommendations: Rehab vs Skilled Nursing Facility  Further Equipment Recommendations for Discharge:  gait belt, hospital bed, mechanical lift, and rolling walker    AMPAC: 9    This AMPAC score should be considered in conjunction with interdisciplinary team recommendations to determine the most appropriate discharge setting. Patient's social support, diagnosis, medical stability, and prior level of function should also be taken into consideration. SUBJECTIVE:       OBJECTIVE DATA SUMMARY:   Critical Behavior:  Neurologic State: Alert  Orientation Level: Oriented to person, Oriented to place  Cognition: Decreased command following  Safety/Judgement: Insight into deficits  Functional Mobility Training:  Bed Mobility:  Rolling: Minimum assistance  Supine to Sit: Minimum assistance  Sit to Supine: Moderate assistance  Scooting: Minimum assistance  Transfers:  Sit to Stand: Minimum assistance  Stand to Sit: Moderate assistance  Balance:  Sitting: Intact  Standing: Impaired; With support  Standing - Static: Fair;Constant support  Standing - Dynamic : Poor   Ambulation/Gait Training:  Distance (ft): 4 Feet (ft) (1' F/B x 1)  Assistive Device: Walker, rolling;Gait belt  Ambulation - Level of Assistance: Minimal assistance  Gait Abnormalities: Antalgic;Decreased step clearance; Step to gait  Left Side Weight Bearing: As tolerated  Base of Support: Shift to right;Narrowed  Stance: Left decreased  Speed/Payal: Slow;Delayed  Swing Pattern: Left symmetrical;Right symmetrical  Interventions: Safety awareness training; Tactile cues; Verbal cues  Therapeutic Exercises:         EXERCISE   Sets   Reps   Active Active Assist   Passive Self ROM   Comments   Ankle Pumps 1 30  [x] [] [] []    Quad Sets/Glut Sets 1 15  [x] [] [] [] Hold for 5 secs   Hamstring Sets   [] [] [] [] Short Arc Quads 1 10 [] [x] [] []    Heel Slides 1 10 [] [x] [] []    Straight Leg Raises   [] [] [] []    Hip Add 1 10 [] [x] [] [] Hold for 5 secs, w/ pillow squeeze   Long Arc Quads   [] [] [] []    Seated Marching   [] [] [] []    Standing Marching   [] [] [] []       [] [] [] []        Pain:  Pain level pre-treatment: Moderate  Pain level post-treatment: Moderate   Pain Intervention(s): Medication (see MAR); Rest, Ice, Repositioning   Response to intervention: Nurse notified, See doc flow    Activity Tolerance:   Good  Please refer to the flowsheet for vital signs taken during this treatment. After treatment:   [] Patient left in no apparent distress sitting up in chair  [x] Patient left in no apparent distress in bed  [x] Call bell left within reach  [x] Nursing notified  [] Caregiver present  [x] Bed alarm activated  [] SCDs applied      COMMUNICATION/EDUCATION:   [x]         Role of Physical Therapy in the acute care setting. [x]         Fall prevention education was provided and the patient/caregiver indicated understanding. [x]         Patient/family have participated as able in working toward goals and plan of care. [x]         Patient/family agree to work toward stated goals and plan of care. []         Patient understands intent and goals of therapy, but is neutral about his/her participation.   []         Patient is unable to participate in stated goals/plan of care: ongoing with therapy staff.  []         Other:        Raghavendra Bey PTA   Time Calculation: 41 mins    Adryan Morales AM-PAC® Basic Mobility Inpatient Short Form (6-Clicks) Version 2    How much HELP from another person does the patient currently need    (If the patient hasn't done an activity recently, how much help from another person do you think he/she would need if he/she tried?)   Total (Total A or Dep)   A Lot  (Mod to Max A)   A Little (Sup or Min A)   None (Mod I to I)   Turning from your back to your side while in a flat bed without using bedrails? [] 1 [x] 2 [] 3 [] 4   2. Moving from lying on your back to sitting on the side of a flat bed without using bedrails? [] 1 [x] 2 [] 3 [] 4   3. Moving to and from a bed to a chair (including a wheelchair)? [] 1 [x] 2 [] 3 [] 4   4. Standing up from a chair using your arms (e.g., wheelchair, or bedside chair)? [] 1 [x] 2 [] 3 [] 4   5. Walking in hospital room? [x] 1 [] 2 [] 3 [] 4   6. Climbing 3-5 steps with a railing?+   [] 1 [] 2 [] 3 [] 4   +If stair climbing cannot be assessed, skip item #6. Sum responses from items 1-5. Based on an AM-PAC score of 9/24 (9/20 if omitting stairs) and their current functional mobility deficits, it is recommended that the patient have 3-5 sessions per week of Physical Therapy at d/c to increase the patient's independence.

## 2023-01-26 LAB
ATRIAL RATE: 71 BPM
CALCULATED P AXIS, ECG09: 65 DEGREES
CALCULATED R AXIS, ECG10: -39 DEGREES
CALCULATED T AXIS, ECG11: 33 DEGREES
DIAGNOSIS, 93000: NORMAL
P-R INTERVAL, ECG05: 192 MS
Q-T INTERVAL, ECG07: 404 MS
QRS DURATION, ECG06: 106 MS
QTC CALCULATION (BEZET), ECG08: 439 MS
VENTRICULAR RATE, ECG03: 71 BPM

## (undated) DEVICE — ENT PACK: Brand: MEDLINE INDUSTRIES, INC.

## (undated) DEVICE — INTENDED FOR TISSUE SEPARATION, AND OTHER PROCEDURES THAT REQUIRE A SHARP SURGICAL BLADE TO PUNCTURE OR CUT.: Brand: BARD-PARKER ® CARBON RIB-BACK BLADES

## (undated) DEVICE — APPLICATOR MEDICATED 26 CC SOLUTION HI LT ORNG CHLORAPREP

## (undated) DEVICE — STERILE MEDICINE CUP 2 OZ KIT: Brand: CARDINAL HEALTH

## (undated) DEVICE — MARKER,SKIN,WI/RULER AND LABELS: Brand: MEDLINE

## (undated) DEVICE — GUIDEWIRE ORTH L400MM DIA3.2MM FOR TFN

## (undated) DEVICE — SOL IRR NACL 0.9% 500ML POUR --

## (undated) DEVICE — GAUZE,SPONGE,8"X4",12PLY,XRAY,STRL,LF: Brand: MEDLINE

## (undated) DEVICE — ROD RMR L950MM DIA2.5MM W/ EXTN BALL TIP

## (undated) DEVICE — HANDPIECE SET WITH HIGH FLOW TIP AND SUCTION TUBE: Brand: INTERPULSE

## (undated) DEVICE — PACK PROCEDURE SURG TOT HIP CUST

## (undated) DEVICE — SCREW BNE L48MM DIA5MM LAT FEM LT GRN TI ST LOK FULL THRD
Type: IMPLANTABLE DEVICE | Site: HIP | Status: NON-FUNCTIONAL
Removed: 2023-01-22

## (undated) DEVICE — STRAP,POSITIONING,KNEE/BODY,FOAM,4X60": Brand: MEDLINE

## (undated) DEVICE — TUBING, SUCTION, 1/4" X 12', STRAIGHT: Brand: MEDLINE

## (undated) DEVICE — TOWEL,OR,DSP,ST,BLUE,STD,4/PK,20PK/CS: Brand: MEDLINE

## (undated) DEVICE — TABLE COVER: Brand: CONVERTORS

## (undated) DEVICE — BIT DRL L330MM DIA4.2MM CALIB 100MM 3 FLUT QUIK CPL

## (undated) DEVICE — BIT DRL L L266MM DIA16MM FEM FLX CANN QUIK CPL

## (undated) DEVICE — C-ARMOR C-ARM EQUIPMENT COVERS CLEAR STERILE UNIVERSAL FIT 12 PER CASE: Brand: C-ARMOR

## (undated) DEVICE — MAYO STAND COVER: Brand: CONVERTORS

## (undated) DEVICE — DRSG GENTLE SA BORDERED 4X4 -- OPTIFOAM

## (undated) DEVICE — BIT DRL L500MM DIA6X9MM CANN STP L QUIK CPL FOR DH DC TFN

## (undated) DEVICE — DRAPE C ARM UNIV W41XL74IN CLR PLAS XR VELC CLSR POLY STRP

## (undated) DEVICE — DRESSING,GAUZE,XEROFORM,CURAD,5"X9",ST: Brand: CURAD

## (undated) DEVICE — OPTIFOAM GENTLE SA, POSTOP, 4X12: Brand: MEDLINE

## (undated) DEVICE — SPONGE,NEURO,0.5"X0.5",XR,STRL,10/PK: Brand: MEDLINE

## (undated) DEVICE — GARMENT,MEDLINE,DVT,INT,CALF,MED, GEN2: Brand: MEDLINE

## (undated) DEVICE — BIT DRL L300MM DIA10MM CANN TAPR L QUIK CPL FOR DH DC TFN

## (undated) DEVICE — NEEDLE HYPO 21GA L1.5IN INTRAMUSCULAR S STL LATCH BVL UP

## (undated) DEVICE — Device

## (undated) DEVICE — SYRINGE, LUER LOCK, 3ML: Brand: MEDLINE

## (undated) DEVICE — ELECTRODE PT RET AD L9FT HI MOIST COND ADH HYDRGEL CORDED

## (undated) DEVICE — SUTURE VCRL + SZ 2-0 L27IN ABSRB UD CT-2 L26MM 1/2 CIR TAPR VCP269H

## (undated) DEVICE — STERILE POLYISOPRENE POWDER-FREE SURGICAL GLOVES: Brand: PROTEXIS

## (undated) DEVICE — UNDERCAST PADDING: Brand: DEROYAL

## (undated) DEVICE — SUTURE VCRL + SZ 1 L36IN ABSRB UD L36MM CT-1 1/2 CIR VCP947H